# Patient Record
Sex: MALE | Race: ASIAN | NOT HISPANIC OR LATINO | Employment: FULL TIME | URBAN - METROPOLITAN AREA
[De-identification: names, ages, dates, MRNs, and addresses within clinical notes are randomized per-mention and may not be internally consistent; named-entity substitution may affect disease eponyms.]

---

## 2018-08-16 ENCOUNTER — OFFICE VISIT - HEALTHEAST (OUTPATIENT)
Dept: INTERNAL MEDICINE | Facility: CLINIC | Age: 28
End: 2018-08-16

## 2018-08-16 DIAGNOSIS — Z23 VACCINE FOR TYPHOID-PARATYPHOID ALONE (TAB): ICD-10-CM

## 2018-08-16 DIAGNOSIS — Z23: ICD-10-CM

## 2018-08-16 DIAGNOSIS — Z71.84 COUNSELING FOR TRAVEL: ICD-10-CM

## 2018-08-16 DIAGNOSIS — Z23 NEED FOR DIPHTHERIA-TETANUS-PERTUSSIS (TDAP) VACCINE, ADULT/ADOLESCENT: ICD-10-CM

## 2018-08-16 DIAGNOSIS — Z23 VACCINE FOR VIRAL HEPATITIS: ICD-10-CM

## 2018-08-16 DIAGNOSIS — Z71.84 COUNSELING ABOUT TRAVEL: ICD-10-CM

## 2018-08-17 ENCOUNTER — COMMUNICATION - HEALTHEAST (OUTPATIENT)
Dept: INTERNAL MEDICINE | Facility: CLINIC | Age: 28
End: 2018-08-17

## 2018-08-23 ENCOUNTER — AMBULATORY - HEALTHEAST (OUTPATIENT)
Dept: INTERNAL MEDICINE | Facility: CLINIC | Age: 28
End: 2018-08-23

## 2018-08-23 ENCOUNTER — AMBULATORY - HEALTHEAST (OUTPATIENT)
Dept: NURSING | Facility: CLINIC | Age: 28
End: 2018-08-23

## 2018-08-23 ENCOUNTER — COMMUNICATION - HEALTHEAST (OUTPATIENT)
Dept: TELEHEALTH | Facility: CLINIC | Age: 28
End: 2018-08-23

## 2018-08-23 DIAGNOSIS — Z71.84 COUNSELING FOR TRAVEL: ICD-10-CM

## 2018-08-23 DIAGNOSIS — Z00.00 HEALTH CARE MAINTENANCE: ICD-10-CM

## 2019-07-17 ENCOUNTER — OFFICE VISIT - HEALTHEAST (OUTPATIENT)
Dept: INTERNAL MEDICINE | Facility: CLINIC | Age: 29
End: 2019-07-17

## 2019-07-17 DIAGNOSIS — J20.9 ACUTE BRONCHITIS WITH SYMPTOMS > 10 DAYS: ICD-10-CM

## 2019-07-17 RX ORDER — FERROUS SULFATE 325(65) MG
1 TABLET ORAL
Status: SHIPPED | COMMUNITY
Start: 2019-07-17 | End: 2023-08-16

## 2019-07-17 RX ORDER — BENZONATATE 100 MG/1
100 CAPSULE ORAL 3 TIMES DAILY PRN
Qty: 30 CAPSULE | Refills: 0 | Status: SHIPPED | OUTPATIENT
Start: 2019-07-17 | End: 2023-08-16

## 2019-07-17 ASSESSMENT — MIFFLIN-ST. JEOR: SCORE: 1740.98

## 2019-07-26 ENCOUNTER — OFFICE VISIT - HEALTHEAST (OUTPATIENT)
Dept: FAMILY MEDICINE | Facility: CLINIC | Age: 29
End: 2019-07-26

## 2019-07-26 DIAGNOSIS — J40 BRONCHITIS: ICD-10-CM

## 2021-05-30 NOTE — PROGRESS NOTES
Internal Medicine Office Visit  [facility]  Patient Name: George Ocasio  Patient Age: 28 y.o.  YOB: 1990  MRN: [id number]    Date of Visit: 2019  Reason for Office Visit:   Chief Complaint   Patient presents with     Cough     2-3 weeks. Not coughing anything up. SOB when running or coughing really hard. Coughs hard to the point of almost vomiting. No fevers. No chest congestion.            Assessment / Plan / Medical Decision Makin. Acute bronchitis with symptoms > 10 days  - predniSONE (DELTASONE) 20 MG tablet; Take 10 mg by mouth daily for 5 days.  Dispense: 5 tablet; Refill: 0  - amoxicillin (AMOXIL) 875 MG tablet; Take 1 tablet (875 mg total) by mouth 2 (two) times a day for 10 days.  Dispense: 20 tablet; Refill: 0  - benzonatate (TESSALON PERLES) 100 MG capsule; Take 1 capsule (100 mg total) by mouth 3 (three) times a day as needed for cough.  Dispense: 30 capsule; Refill: 0    Patient advised if symptoms persist, worsen or new symptoms arise they are to seek medical care.  All patients questions addressed. Patient verbalized understanding and agreement with plan.     No orders of the defined types were placed in this encounter.  Followup: Return in about 1 week (around 2019). earlier if needed.    Health Maintenance Review  Health Maintenance   Topic Date Due     ADVANCE DIRECTIVES DISCUSSED WITH PATIENT  2006     INFLUENZA VACCINE RULE BASED (1) 2019     TD 18+ HE  2028     TDAP ADULT ONE TIME DOSE  Completed         I am having George Ocasio start on predniSONE, amoxicillin, and benzonatate. I am also having him maintain his mefloquine, typhoid, and ferrous sulfate. We will continue to administer typhoid polysaccharide.      HPI:  George Ocasio is a 28 y.o. year old who presents to the office today for chief complaint of a 3-week history of cough mild shortness of breath with activity without chest pain, chest pressure heart palpitations.   Patient reports coughing to the point of vomiting.  He denies any fever, chills, malaise or diaphoresis.  He has been utilizing over-the-counter cough and cold medications was not found relief of symptoms.        Review of Systems- pertinent positive in bold:  Constitutional: Fever, chills, night sweats, fainting, weight change, fatigue, dizziness, sleeping difficulties, loud snoring/pauses in breathing  Eyes: change in vision, blurred or double vision, redness/eye pain  Ears, nose, mouth, throat: change in hearing, ear pain, hoarseness, difficulty swallowing, sores in the mouth or throat  Respiratory: shortness of breath, cough, bloody sputum, wheezing  Cardiovascular: chest pain, palpitations   Gastrointestinal: abdominal pain, heartburn/indigestion, nausea/vomiting, change in appetite, change in bowel habits, constipation or diarrhea, rectal bleeding/dark stools, difficulty swallowing  Urinary: painful urination, frequent urination, urinary urgency/incontinence, blood in urine/dark urine, nocturia (new or increasing), muscle cramps, swelling of hands, feet, ankles, leg pain/redness  Skin: change in moles/freckles, rash, nodules  Hematologic/lymphatic: swollen lymph glands, abnormal bruising/bleeding  Endocrine: excessive thirst/urination, cold or heat intolerance  Neurologic/emotional: worrisome memory change, numbness/tingling, anxiety, mood swings      Current Scheduled Meds:  Outpatient Encounter Medications as of 7/17/2019   Medication Sig Dispense Refill     ferrous sulfate 325 (65 FE) MG tablet Take 1 tablet by mouth daily with breakfast.       amoxicillin (AMOXIL) 875 MG tablet Take 1 tablet (875 mg total) by mouth 2 (two) times a day for 10 days. 20 tablet 0     benzonatate (TESSALON PERLES) 100 MG capsule Take 1 capsule (100 mg total) by mouth 3 (three) times a day as needed for cough. 30 capsule 0     mefloquine (LARIAM) 250 mg tablet Take 1 tablet weekly, starting three weeks before departure. 8  "tablet 0     predniSONE (DELTASONE) 20 MG tablet Take 10 mg by mouth daily for 5 days. 5 tablet 0     typhoid (VIVOTIF) SR capsule Take 1 capsule by mouth every other day. 4 capsule 0     Facility-Administered Encounter Medications as of 7/17/2019   Medication Dose Route Frequency Provider Last Rate Last Dose     typhod polysaccharide injection 0.5 mL (TYPHIM VI)  0.5 mL Intramuscular Prior to Discharge Mario Fu MD         No past medical history on file.  No past surgical history on file.  Social History     Tobacco Use     Smoking status: Never Smoker     Smokeless tobacco: Never Used   Substance Use Topics     Alcohol use: Yes     Alcohol/week: 0.6 - 1.2 oz     Types: 1 - 2 Cans of beer per week     Drug use: No     Family History   Problem Relation Age of Onset     Diabetes Neg Hx      Heart disease Neg Hx      Objective / Physical Examination:  Vitals:    07/17/19 1317   BP: 114/74   Pulse: 92   Resp: 24   Temp: 98.7  F (37.1  C)   SpO2: 100%   Weight: 187 lb (84.8 kg)   Height: 5' 6\" (1.676 m)     Wt Readings from Last 3 Encounters:   07/17/19 187 lb (84.8 kg)   08/16/18 179 lb 4.8 oz (81.3 kg)     Body mass index is 30.18 kg/m .     General Appearance: Alert and oriented, cooperative, affect appropriate, speech clear, in no apparent distress  Head: Normocephalic, atraumatic  Ears: Tympanic membrane clear with landmarks well visualized bilaterally  Eyes: PERRL,  Conjunctivae clear and sclerae non-icteric  Nose: Septum midline, nares patent,  mucosa moist and without drainage  Throat: Lips and mucosa moist. Teeth in good repair, pharynx without erythema or exudate  Neck: Supple, trachea midline. No cervical adenopathy  Lungs: Coarsely clear to auscultation bilaterally. No adventitious lung sounds noted. Normal inspiratory and expiratory effort  Cardiovascular: Regular rate, normal S1, S2. No murmurs, rubs, or gallops  Extremities: Pulses 2+ and equal throughout. No edema.   Integumentary: Warm and dry. " Without suspicious looking lesions  Neuro: Alert and oriented, follows commands appropriately.         Breonna Maynard, CNP

## 2021-05-31 NOTE — PROGRESS NOTES
Assessment/Plan:        1. Bronchitis  Patient reporting to overall improvement   exam findings were discussed and reassurance given    Follow-up as needed        Subjective:    Patient ID:   George Ocasio is a 28 y.o. male comes in follow-up to recent evaluation of upper respiratory symptoms on 2019 with a diagnosis of acute bronchitis, treated with antibiotic and a 5-day course of oral prednisone.  Is overall doing well with no additional concerns      Review of Systems  Allergy: reviewed  A complete 5 point review of systems was obtained and is negative other than what is stated in the HPI.     The following patient's history were reviewed and updated as appropriate:   He  has no past medical history on file..      Outpatient Encounter Medications as of 2019   Medication Sig Dispense Refill     [] amoxicillin (AMOXIL) 875 MG tablet Take 1 tablet (875 mg total) by mouth 2 (two) times a day for 10 days. 20 tablet 0     benzonatate (TESSALON PERLES) 100 MG capsule Take 1 capsule (100 mg total) by mouth 3 (three) times a day as needed for cough. 30 capsule 0     ferrous sulfate 325 (65 FE) MG tablet Take 1 tablet by mouth daily with breakfast.       Facility-Administered Encounter Medications as of 2019   Medication Dose Route Frequency Provider Last Rate Last Dose     typhod polysaccharide injection 0.5 mL (TYPHIM VI)  0.5 mL Intramuscular Prior to Discharge Mario Fu MD             Objective:   /66 (Patient Site: Right Arm, Patient Position: Sitting, Cuff Size: Adult Regular)   Pulse 86   Wt 186 lb (84.4 kg)   SpO2 99%   BMI 30.02 kg/m        Physical Exam  General Appearance:    Alert, cooperative, no distress, appears stated age   Neck:   Supple, symmetrical, trachea midline, no adenopathy;        thyroid:  No enlargement/tenderness/nodules; no carotid    bruit or JVD   Back:     No palpable tenderness   Lungs:     Clear to auscultation bilaterally, respirations unlabored   Chest  wall:    No tenderness or deformity   Heart:    Regular rate and rhythm, S1 and S2 normal, no murmur, rub    or gallop   Skin:   Skin color, texture, turgor normal, no rashes or lesions

## 2021-06-01 VITALS — WEIGHT: 179.3 LBS

## 2021-06-03 VITALS — HEIGHT: 66 IN | BODY MASS INDEX: 30.05 KG/M2 | WEIGHT: 187 LBS

## 2021-06-03 VITALS — BODY MASS INDEX: 30.02 KG/M2 | WEIGHT: 186 LBS

## 2021-06-19 NOTE — PROGRESS NOTES
1. Counseling about travel     2. Counseling for travel     3. Need for diphtheria-tetanus-pertussis (Tdap) vaccine, adult/adolescent  Tdap vaccine greater than or equal to 6yo IM   4. Vaccine for viral hepatitis     5. Need for malaria vaccination     6. Vaccine for typhoid-paratyphoid alone (TAB)        Plan: Immunizations given as above, we gave him tetanus today as well as the hepatitis A vaccine.  I gave him pills for the malaria prophylaxis as well as typhoid we discussed looking at the [facility] website, I advised him to go there and look at some other things that he could do to make himself be safe while he was there.  Follow-up with him as needed then before he goes or if he has trouble with his medications.    Subjective: 27-year-old male new patient to our clinic who is here today for travel counseling.  He is heading to the [place] next month and would like to know what he needs to have done before he goes.  He is in generally good health and has no major medical problems or concerns today.    Patient is new patient to the clinic. Please see past medical history, surgical history, social history and family history, all of which were completed in their entirety today.     15 minutes spent together with the patient today, more than 50% spent in counseling, discussing the above topics.

## 2021-08-22 ENCOUNTER — HEALTH MAINTENANCE LETTER (OUTPATIENT)
Age: 31
End: 2021-08-22

## 2021-10-17 ENCOUNTER — HEALTH MAINTENANCE LETTER (OUTPATIENT)
Age: 31
End: 2021-10-17

## 2022-10-02 ENCOUNTER — HEALTH MAINTENANCE LETTER (OUTPATIENT)
Age: 32
End: 2022-10-02

## 2023-05-08 ENCOUNTER — NURSE TRIAGE (OUTPATIENT)
Dept: NURSING | Facility: CLINIC | Age: 33
End: 2023-05-08
Payer: COMMERCIAL

## 2023-05-08 NOTE — TELEPHONE ENCOUNTER
S-(situation): left ankle injury    B-(background):   Injured it yesterday.    A-(assessment):   Left ankle bruising and swelling  Unable to bear weight, uses brother's old crutches for now    No bleeding, no open wound      R-(recommendations):   Advised UC/ED. Gave options:  [facility] opens at 10 AM  [facility] or [facility] urgent care. Call insurance to see if covered.    Advised cold compress x 20 minutes QID, rest and avoid weight bearing until seen. Elevate left leg.    Pt verbalized understanding.    Tracy Mcadams RN/[place] Nurse Advisor            Reason for Disposition    Can't stand (bear weight) or walk (e.g., 4 steps)    Additional Information    Negative: Major bleeding (actively dripping or spurting) that can't be stopped    Negative: Amputation or bone sticking through the skin    Negative: Looks like a dislocated joint (crooked or deformed)    Negative: Serious injury with multiple fractures (broken bones)    Negative: Sounds like a life-threatening emergency to the triager    Negative: Wound looks infected    Negative: Caused by an animal bite    Negative: Puncture wound of foot    Negative: Toe injury is main symptom    Negative: Cast problems or questions    Negative: Bullet, stabbed by knife or other serious penetrating wound    Protocols used: ANKLE AND FOOT INJURY-A-OH

## 2023-08-14 ENCOUNTER — TELEPHONE (OUTPATIENT)
Dept: FAMILY MEDICINE | Facility: CLINIC | Age: 33
End: 2023-08-14
Payer: COMMERCIAL

## 2023-08-14 NOTE — TELEPHONE ENCOUNTER
Reason for Call:  Appointment Request    Patient requesting this type of appt:  Preventive     Requested provider:  Anyone at [facility] clinic    Reason patient unable to be scheduled: Not within requested timeframe    When does patient want to be seen/preferred time:  asap    Comments: Pt is scheduled for 10/05/2023. Has not been seen in [facility] system before but would like to come in sooner if possible. Pt states he has fatigue lately that he wants to address at this appointment & have labs drawn.     Could we send this information to you in [employer] or would you prefer to receive a phone call?:   No preference - patient would like to be contacted by both telelphone & [employer] please  Okay to leave a detailed message?: Yes at Home number on file [phone] (home)    Call taken on 8/14/2023 at 9:34 AM by Jazmín Conn

## 2023-08-14 NOTE — TELEPHONE ENCOUNTER
Call placed to patient regarding message to schedule sooner appointment. No openings sooner. Did offer to place on wait list he accepted wait list created.

## 2023-08-16 ENCOUNTER — OFFICE VISIT (OUTPATIENT)
Dept: FAMILY MEDICINE | Facility: CLINIC | Age: 33
End: 2023-08-16
Payer: COMMERCIAL

## 2023-08-16 ENCOUNTER — HOSPITAL ENCOUNTER (INPATIENT)
Facility: HOSPITAL | Age: 33
LOS: 1 days | Discharge: HOME | DRG: 395 | End: 2023-08-17
Attending: EMERGENCY MEDICINE | Admitting: INTERNAL MEDICINE
Payer: COMMERCIAL

## 2023-08-16 ENCOUNTER — APPOINTMENT (OUTPATIENT)
Dept: CT IMAGING | Facility: HOSPITAL | Age: 33
DRG: 395 | End: 2023-08-16
Attending: EMERGENCY MEDICINE
Payer: COMMERCIAL

## 2023-08-16 ENCOUNTER — APPOINTMENT (OUTPATIENT)
Dept: RADIOLOGY | Facility: HOSPITAL | Age: 33
DRG: 395 | End: 2023-08-16
Attending: EMERGENCY MEDICINE
Payer: COMMERCIAL

## 2023-08-16 VITALS
WEIGHT: 181.1 LBS | SYSTOLIC BLOOD PRESSURE: 120 MMHG | DIASTOLIC BLOOD PRESSURE: 66 MMHG | BODY MASS INDEX: 29.23 KG/M2 | OXYGEN SATURATION: 100 % | HEART RATE: 112 BPM | RESPIRATION RATE: 16 BRPM | TEMPERATURE: 98.6 F

## 2023-08-16 DIAGNOSIS — R20.2 PARESTHESIA: Primary | ICD-10-CM

## 2023-08-16 DIAGNOSIS — R42 LIGHTHEADEDNESS: ICD-10-CM

## 2023-08-16 DIAGNOSIS — R06.02 SHORTNESS OF BREATH: ICD-10-CM

## 2023-08-16 DIAGNOSIS — D64.9 ANEMIA, UNSPECIFIED TYPE: ICD-10-CM

## 2023-08-16 DIAGNOSIS — R07.9 CHEST PAIN, UNSPECIFIED TYPE: ICD-10-CM

## 2023-08-16 DIAGNOSIS — K64.8 INTERNAL HEMORRHOID, BLEEDING: Primary | ICD-10-CM

## 2023-08-16 LAB
ABO/RH(D): NORMAL
ABO/RH(D): NORMAL
ALBUMIN SERPL BCG-MCNC: 4.6 G/DL (ref 3.5–5.2)
ALP SERPL-CCNC: 54 U/L (ref 40–129)
ALT SERPL W P-5'-P-CCNC: 19 U/L (ref 0–70)
ANION GAP SERPL CALCULATED.3IONS-SCNC: 9 MMOL/L (ref 7–15)
ANTIBODY SCREEN: NEGATIVE
AST SERPL W P-5'-P-CCNC: 21 U/L (ref 0–45)
BASOPHILS # BLD AUTO: 0 10E3/UL (ref 0–0.2)
BASOPHILS NFR BLD AUTO: 1 %
BILIRUB SERPL-MCNC: 0.7 MG/DL
BLD PROD TYP BPU: NORMAL
BLD PROD TYP BPU: NORMAL
BLOOD COMPONENT TYPE: NORMAL
BLOOD COMPONENT TYPE: NORMAL
BUN SERPL-MCNC: 10.4 MG/DL (ref 6–20)
CALCIUM SERPL-MCNC: 9.3 MG/DL (ref 8.6–10)
CHLORIDE SERPL-SCNC: 104 MMOL/L (ref 98–107)
CODING SYSTEM: NORMAL
CODING SYSTEM: NORMAL
CREAT SERPL-MCNC: 0.84 MG/DL (ref 0.67–1.17)
CROSSMATCH: NORMAL
CROSSMATCH: NORMAL
D DIMER PPP FEU-MCNC: 0.29 UG/ML FEU (ref 0–0.5)
DEPRECATED HCO3 PLAS-SCNC: 26 MMOL/L (ref 22–29)
EOSINOPHIL # BLD AUTO: 0.1 10E3/UL (ref 0–0.7)
EOSINOPHIL NFR BLD AUTO: 2 %
ERYTHROCYTE [DISTWIDTH] IN BLOOD BY AUTOMATED COUNT: 19.3 % (ref 10–15)
FERRITIN SERPL-MCNC: 1 NG/ML (ref 31–409)
FLUAV RNA SPEC QL NAA+PROBE: NEGATIVE
FLUBV RNA RESP QL NAA+PROBE: NEGATIVE
GFR SERPL CREATININE-BSD FRML MDRD: >90 ML/MIN/1.73M2
GLUCOSE SERPL-MCNC: 102 MG/DL (ref 70–99)
HCT VFR BLD AUTO: 20.4 % (ref 40–53)
HGB BLD-MCNC: 4.9 G/DL (ref 13.3–17.7)
HOLD SPECIMEN: NORMAL
IMM GRANULOCYTES # BLD: 0 10E3/UL
IMM GRANULOCYTES NFR BLD: 0 %
INR PPP: 1.04 (ref 0.85–1.15)
IRON BINDING CAPACITY (ROCHE): 452 UG/DL (ref 240–430)
IRON SATN MFR SERPL: 3 % (ref 15–46)
IRON SERPL-MCNC: 13 UG/DL (ref 61–157)
ISSUE DATE AND TIME: NORMAL
ISSUE DATE AND TIME: NORMAL
LIPASE SERPL-CCNC: 50 U/L (ref 13–60)
LYMPHOCYTES # BLD AUTO: 0.8 10E3/UL (ref 0.8–5.3)
LYMPHOCYTES NFR BLD AUTO: 24 %
MAGNESIUM SERPL-MCNC: 2.2 MG/DL (ref 1.7–2.3)
MCH RBC QN AUTO: 14.4 PG (ref 26.5–33)
MCHC RBC AUTO-ENTMCNC: 24 G/DL (ref 31.5–36.5)
MCV RBC AUTO: 60 FL (ref 78–100)
MONOCYTES # BLD AUTO: 0.3 10E3/UL (ref 0–1.3)
MONOCYTES NFR BLD AUTO: 9 %
NEUTROPHILS # BLD AUTO: 2.2 10E3/UL (ref 1.6–8.3)
NEUTROPHILS NFR BLD AUTO: 64 %
NRBC # BLD AUTO: 0.1 10E3/UL
NRBC BLD AUTO-RTO: 2 /100
PLATELET # BLD AUTO: 220 10E3/UL (ref 150–450)
POTASSIUM SERPL-SCNC: 3.9 MMOL/L (ref 3.4–5.3)
PROT SERPL-MCNC: 7.3 G/DL (ref 6.4–8.3)
RBC # BLD AUTO: 3.4 10E6/UL (ref 4.4–5.9)
RSV RNA SPEC NAA+PROBE: NEGATIVE
SARS-COV-2 RNA RESP QL NAA+PROBE: NEGATIVE
SODIUM SERPL-SCNC: 139 MMOL/L (ref 136–145)
SPECIMEN EXPIRATION DATE: NORMAL
SPECIMEN EXPIRATION DATE: NORMAL
TROPONIN T SERPL HS-MCNC: <6 NG/L
UNIT ABO/RH: NORMAL
UNIT ABO/RH: NORMAL
UNIT NUMBER: NORMAL
UNIT NUMBER: NORMAL
UNIT STATUS: NORMAL
UNIT STATUS: NORMAL
UNIT TYPE ISBT: 5100
UNIT TYPE ISBT: 5100
WBC # BLD AUTO: 3.4 10E3/UL (ref 4–11)

## 2023-08-16 PROCEDURE — 80053 COMPREHEN METABOLIC PANEL: CPT | Performed by: EMERGENCY MEDICINE

## 2023-08-16 PROCEDURE — 84484 ASSAY OF TROPONIN QUANT: CPT | Performed by: EMERGENCY MEDICINE

## 2023-08-16 PROCEDURE — 96375 TX/PRO/DX INJ NEW DRUG ADDON: CPT

## 2023-08-16 PROCEDURE — 85610 PROTHROMBIN TIME: CPT | Performed by: EMERGENCY MEDICINE

## 2023-08-16 PROCEDURE — 36430 TRANSFUSION BLD/BLD COMPNT: CPT

## 2023-08-16 PROCEDURE — 250N000011 HC RX IP 250 OP 636: Mod: JZ | Performed by: EMERGENCY MEDICINE

## 2023-08-16 PROCEDURE — 96365 THER/PROPH/DIAG IV INF INIT: CPT | Mod: XU

## 2023-08-16 PROCEDURE — 71046 X-RAY EXAM CHEST 2 VIEWS: CPT

## 2023-08-16 PROCEDURE — 86923 COMPATIBILITY TEST ELECTRIC: CPT | Performed by: EMERGENCY MEDICINE

## 2023-08-16 PROCEDURE — 83735 ASSAY OF MAGNESIUM: CPT | Performed by: EMERGENCY MEDICINE

## 2023-08-16 PROCEDURE — 99207 PR INPT ADMISSION FROM CLINIC: CPT | Performed by: PHYSICIAN ASSISTANT

## 2023-08-16 PROCEDURE — 96361 HYDRATE IV INFUSION ADD-ON: CPT

## 2023-08-16 PROCEDURE — 85025 COMPLETE CBC W/AUTO DIFF WBC: CPT | Performed by: EMERGENCY MEDICINE

## 2023-08-16 PROCEDURE — 93005 ELECTROCARDIOGRAM TRACING: CPT | Performed by: PHYSICIAN ASSISTANT

## 2023-08-16 PROCEDURE — 93010 ELECTROCARDIOGRAM REPORT: CPT | Performed by: INTERNAL MEDICINE

## 2023-08-16 PROCEDURE — 99285 EMERGENCY DEPT VISIT HI MDM: CPT | Mod: 25

## 2023-08-16 PROCEDURE — 86901 BLOOD TYPING SEROLOGIC RH(D): CPT | Performed by: EMERGENCY MEDICINE

## 2023-08-16 PROCEDURE — 99222 1ST HOSP IP/OBS MODERATE 55: CPT | Performed by: INTERNAL MEDICINE

## 2023-08-16 PROCEDURE — P9016 RBC LEUKOCYTES REDUCED: HCPCS | Performed by: EMERGENCY MEDICINE

## 2023-08-16 PROCEDURE — 70498 CT ANGIOGRAPHY NECK: CPT

## 2023-08-16 PROCEDURE — 85379 FIBRIN DEGRADATION QUANT: CPT | Performed by: EMERGENCY MEDICINE

## 2023-08-16 PROCEDURE — 70496 CT ANGIOGRAPHY HEAD: CPT

## 2023-08-16 PROCEDURE — 82728 ASSAY OF FERRITIN: CPT | Performed by: EMERGENCY MEDICINE

## 2023-08-16 PROCEDURE — 93005 ELECTROCARDIOGRAM TRACING: CPT | Performed by: EMERGENCY MEDICINE

## 2023-08-16 PROCEDURE — 83550 IRON BINDING TEST: CPT | Performed by: EMERGENCY MEDICINE

## 2023-08-16 PROCEDURE — 86850 RBC ANTIBODY SCREEN: CPT | Performed by: EMERGENCY MEDICINE

## 2023-08-16 PROCEDURE — 120N000001 HC R&B MED SURG/OB

## 2023-08-16 PROCEDURE — 258N000003 HC RX IP 258 OP 636: Performed by: EMERGENCY MEDICINE

## 2023-08-16 PROCEDURE — 83690 ASSAY OF LIPASE: CPT | Performed by: EMERGENCY MEDICINE

## 2023-08-16 PROCEDURE — 36415 COLL VENOUS BLD VENIPUNCTURE: CPT | Performed by: EMERGENCY MEDICINE

## 2023-08-16 PROCEDURE — 87637 SARSCOV2&INF A&B&RSV AMP PRB: CPT | Performed by: EMERGENCY MEDICINE

## 2023-08-16 RX ORDER — ACETAMINOPHEN 325 MG/1
650 TABLET ORAL EVERY 4 HOURS PRN
Status: DISCONTINUED | OUTPATIENT
Start: 2023-08-16 | End: 2023-08-17 | Stop reason: HOSPADM

## 2023-08-16 RX ORDER — DIPHENHYDRAMINE HYDROCHLORIDE 50 MG/ML
25 INJECTION INTRAMUSCULAR; INTRAVENOUS ONCE
Status: COMPLETED | OUTPATIENT
Start: 2023-08-16 | End: 2023-08-16

## 2023-08-16 RX ORDER — LIDOCAINE 40 MG/G
CREAM TOPICAL
Status: DISCONTINUED | OUTPATIENT
Start: 2023-08-16 | End: 2023-08-17 | Stop reason: HOSPADM

## 2023-08-16 RX ORDER — IOPAMIDOL 755 MG/ML
75 INJECTION, SOLUTION INTRAVASCULAR ONCE
Status: COMPLETED | OUTPATIENT
Start: 2023-08-16 | End: 2023-08-16

## 2023-08-16 RX ORDER — ACETAMINOPHEN 500 MG
500 TABLET ORAL PRN
COMMUNITY
End: 2023-12-05

## 2023-08-16 RX ORDER — METOCLOPRAMIDE HYDROCHLORIDE 5 MG/ML
5 INJECTION INTRAMUSCULAR; INTRAVENOUS ONCE
Status: COMPLETED | OUTPATIENT
Start: 2023-08-16 | End: 2023-08-16

## 2023-08-16 RX ADMIN — METOCLOPRAMIDE 5 MG: 5 INJECTION, SOLUTION INTRAMUSCULAR; INTRAVENOUS at 13:06

## 2023-08-16 RX ADMIN — DIPHENHYDRAMINE HYDROCHLORIDE 25 MG: 50 INJECTION, SOLUTION INTRAMUSCULAR; INTRAVENOUS at 13:06

## 2023-08-16 RX ADMIN — IOPAMIDOL 75 ML: 755 INJECTION, SOLUTION INTRAVENOUS at 15:16

## 2023-08-16 RX ADMIN — SODIUM CHLORIDE 1000 ML: 9 INJECTION, SOLUTION INTRAVENOUS at 13:06

## 2023-08-16 ASSESSMENT — ACTIVITIES OF DAILY LIVING (ADL)
ADLS_ACUITY_SCORE: 38

## 2023-08-16 NOTE — MEDICATION SCRIBE - ADMISSION MEDICATION HISTORY
Medication Scribe Admission Medication History    Admission medication history is complete. The information provided in this note is only as accurate as the sources available at the time of the update.    Medication reconciliation/reorder completed by provider prior to medication history? No    Information Source(s): Patient via in-person    Pertinent Information:     Changes made to PTA medication list:  Added: Tylenol, Robitussin Nighttime  Deleted: Iron, Tessalon, Typhoid V  Changed: None    Medication Affordability:  Not including over the counter (OTC) medications, was there a time in the past 3 months when you did not take your medications as prescribed because of cost?: No    Allergies reviewed with patient and updates made in EHR: yes    Medication History Completed By: Yong Morgan 8/16/2023 2:32 PM    Prior to Admission medications    Medication Sig Last Dose Taking? Auth Provider Long Term End Date   acetaminophen (TYLENOL) 500 MG tablet Take 500 mg by mouth as needed for mild pain 8/15/2023 at 1800 Yes Reported, Patient     [id number] (ROBITUSSIN NIGHT [facility]/COLD/FLU PO) Take by mouth as needed 8/15/2023 at pm Yes Reported, Patient

## 2023-08-16 NOTE — ED TRIAGE NOTES
Amb to triage.  Pt states for about 1 month noted skin seems pale-denies black, tarry, or bloody stools or vomiting.  For about 1 wk intermittent numbness in L arm and consistent for about 24 hours.  Notes pain in L upper chest when looks upward with head.  Pt also notes has had headache in back base of head for 1 wk and consistent for 24 hours.  Denies trauma.      Triage Assessment       Row Name 08/16/23 1134       Triage Assessment (Adult)    Airway WDL WDL       Respiratory WDL    Respiratory WDL WDL       Skin Circulation/Temperature WDL    Skin Circulation/Temperature WDL all    Skin Circulation pallor  reports pale skin for about 1 month    Skin Temperature warm       Cardiac WDL    Cardiac WDL X;rhythm    Pulse Rate & Regularity tachycardic;radial pulse regular       Peripheral/Neurovascular WDL    Peripheral Neurovascular WDL WDL       Cognitive/Neuro/Behavioral WDL    Cognitive/Neuro/Behavioral WDL WDL

## 2023-08-16 NOTE — ED PROVIDER NOTES
EMERGENCY DEPARTMENT ENCOUNTER      NAME: George Ocasio  AGE: 32 year old male  YOB: 1990  MRN: [id number]  EVALUATION DATE & TIME: 8/16/2023 11:39 AM    PCP: No Ref-Primary, Physician    ED PROVIDER: Anne Ibrahim MD      Chief Complaint   Patient presents with    Numbness    Shortness of Breath         FINAL IMPRESSION:  1. Anemia, unspecified type          ED COURSE & MEDICAL DECISION MAKING:    Pertinent Labs & Imaging studies reviewed. (See chart for details)    11:44 AM I introduced myself to the patient, obtained patient history, performed a physical exam, and discussed plan for ED workup including potential diagnostic laboratory/imaging studies and interventions.  4:49 PM I spoke with the Dr. Gaines, Hospitalist.      32 year old male presents to the Emergency Department for evaluation of left arm numbness/paresthesia, light headedness, and shortness of breath.  Allan he is mildly tachycardic.  Does not actually have any focal deficit despite his report of subjective paresthesias in the left upper extremity that extend into all of his fingers.  He has been having chiropractic manipulation done of his neck and has a posterior occiput headache similar to prior migraines.  He does appear pale.  Abdominal exam is benign without any tenderness or pain.  Differential diagnosis includes but is not limited to carotid or vertebral dissection, intracranial hemorrhage, less likely CVA with his presentation, atypical acute coronary syndrome, anemia, COVID-19 or other viral illness, mass, atypical migraine, PE, less likely aortic dissection without chest pain, etc.  EKG was obtained which shows a right bundle branch block but no sign of acute ischemia.  Laboratory studies obtained.  Discussed treatment for migraine with IV Reglan and IV Benadryl as well as a liter of IV fluids and he was in agreement with this.  Held on Toradol until imaging.  D-dimer obtained which is negative making PE unlikely.  Also makes  aortic dissection less likely.  CTA of the head and neck as well as noncontrast CT obtained of the head.  Chest x-ray obtained.    Hemoglobin returned at 4.9.  This is likely the cause of many of his symptoms.  White blood cell count also slightly decreased at 3.4 with normal platelet count.  CMP is unremarkable.  Lipase within normal limits.  Magnesium within normal limits.  Troponin less than 6 and reassuring EKG making acute coronary syndrome less likely in a healthy 32-year-old.  Type and screen added and 2 units of packed red blood cells were given.  Patient was consented for this and in agreement.  COVID-19 influenza and RSV PCR is negative.  Added on iron studies as well.  INR within normal limits.  CT of the head and neck and noncontrast CT without acute pathology.  Chest x-ray unremarkable.  He does report that he has had some intermittent bright red blood per rectum for a long time.  States he last had this about a week ago.  No current bleeding.  He felt it was potentially related to hemorrhoids.  Unfortunately is in the hallway so I cannot perform a rectal exam at this time.  Do feel he warrants admission for further evaluation and GI evaluation and monitoring of hemoglobin after transfusion.  His tachycardia improved with transfusion.  He is in agreement with this plan.  Spoke with the hospitalist who excepted the patient for admission.  He was admitted in stable condition.         At the conclusion of the encounter I discussed the results of all of the tests and the disposition. The questions were answered. The patient or family acknowledged understanding and was agreeable with the care plan.     Critical Care  Performed by: Anne Ibrahim MD  Authorized by: Anne Ibrahim MD     Total critical care time: 30 minutes  Critical care time was exclusive of separately billable procedures and treating other patients.  Critical care was necessary to treat or prevent imminent or life-threatening  deterioration of the following conditions: severe anemia requiring blood transfusion  Critical care was time spent personally by me on the following activities: development of treatment plan with patient or surrogate, discussions with consultants, examination of patient, evaluation of patient's response to treatment, obtaining history from patient or surrogate, ordering and performing treatments and interventions, ordering and review of laboratory studies, ordering and review of radiographic studies and re-evaluation of patient's condition, this excludes any separately billable procedures.        Medical Decision Making    History:  Supplemental history from: Documented in chart, if applicable  External Record(s) reviewed: Documented in chart, if applicable. and Outpatient Record: Clinic visit earlier today    Work Up:  Chart documentation includes differential considered and any EKGs or imaging independently interpreted by provider.  In additional to work up documented, I considered the following work up: Documented in chart, if applicable.    External consultation:  Discussion of management with another provider: Documented in chart, if applicable    Complicating factors:  Care impacted by chronic illness: N/A  Care affected by social determinants of health: N/A    Disposition considerations: Admit.      MEDICATIONS GIVEN IN THE EMERGENCY:  Medications   0.9% sodium chloride BOLUS (0 mLs Intravenous Stopped 8/16/23 1408)   metoclopramide (REGLAN) injection 5 mg (5 mg Intravenous $Given 8/16/23 1306)   diphenhydrAMINE (BENADRYL) injection 25 mg (25 mg Intravenous $Given 8/16/23 1306)   iopamidol (ISOVUE-370) solution 75 mL (75 mLs Intravenous $Given 8/16/23 1516)       NEW PRESCRIPTIONS STARTED AT TODAY'S ER VISIT  New Prescriptions    No medications on file          =================================================================    HPI    Patient information was obtained from: the patient    Use of :  N/A      George Ocasio is a 32 year old male with no known medical history who presents to this ED for evaluation of shortness of breath and numbness.    Per chart review, the patient was seen earlier today at clinic for left arm numbness and tingling. The patient was instructed to present to the ED for further evaluation.    Patient presents with intermittent left arm numbness and tingling that radiates to all of his fingers in his left hand. This has been constant for the past day but has been intermittent for the past week. He additionally reports shortness of breath with exertion and lightheadedness. The patient endorses a headache centralized in the back right of his head that feels similar to his past migraines. The patient regularly sees a chiropractor where he has neck adjustments done. His last visit was last Thursday. He reports that he has had a dry cough for the past month and had a previous negative covid test at home. He had COVID last year. The patient reports no history of DVT/PE or cardiac disease. Denies any family history of premature CAD or DVT/PE. He denies any recent travel. No leg pain or swelling. He has been taking robitussin (last at 11:30 AM) and Tylenol (last 14 hrs ago) for his symptoms with some relief. He otherwise takes no regular medication. The patient denies fever, chest pain, chills, abdominal pain, vision changes, weakness, nausea, vomiting, diarrhea, and any other symptoms at this time. No injury to the left arm.     He later states he has intermittent bright red rectal bleeding that has occurred for over a year. He reports he last had some bleeding last week. He felt this may have been related to hemorrhoids. He has not been evaluated for this. No melena. No hematuria or other bleeding.     REVIEW OF SYSTEMS   Review of Systems   Pertinent positives and negatives are documented in the HPI. All other systems reviewed and are negative.      PAST MEDICAL HISTORY:  History reviewed. No  "pertinent past medical history.    PAST SURGICAL HISTORY:  No past surgical history on file.        CURRENT MEDICATIONS:    acetaminophen (TYLENOL) 500 MG tablet  [id number] (ROBITUSSIN NIGHT CGH/COLD/FLU PO)        ALLERGIES:  No Known Allergies    FAMILY HISTORY:  Family History   Problem Relation Age of Onset    Diabetes No family hx of     Heart Disease No family hx of        SOCIAL HISTORY:   Social History     Socioeconomic History    Marital status: Single   Tobacco Use    Smoking status: Never    Smokeless tobacco: Never   Substance and Sexual Activity    Alcohol use: Yes     Alcohol/week: 1.0 - 2.0 standard drink of alcohol    Drug use: No       VITALS:  /75   Pulse 90   Temp 99  F (37.2  C) (Oral)   Resp 23   Ht 1.676 m (5' 6\")   Wt 83 kg (182 lb 14.4 oz)   SpO2 99%   BMI 29.52 kg/m      PHYSICAL EXAM    Physical Exam  Constitutional: Well developed, Well nourished, NAD, GCS 15  HENT: Normocephalic, Atraumatic, Bilateral external ears normal, Oropharynx normal, mucous membranes moist, Nose normal. Neck- Normal range of motion, No tenderness, Supple, No stridor.    Eyes: PERRL, EOMI, Conjunctiva normal, No discharge.   Respiratory: Normal breath sounds, No respiratory distress, No wheezing or crackles, Speaks in full sentences easily.   Cardiovascular: Tachycardia, Regular rhythm, No murmurs, No rubs, No gallops. 2+ radial pulses bilaterally.   GI: Bowel sounds normal, Soft, No tenderness, No masses, No rebound or guarding. No CVA tenderness bilaterally   Musculoskeletal: 2+ DP pulses. No notable lower extremity edema.  Good range of motion in all major joints. No tenderness to palpation or major deformities noted. No tenderness of the CTLS spine.   Integument: Warm, Dry, Pale, No erythema, No rash. No petechiae.   Neurologic: Alert & oriented x 3, 5/5 strength in all 4 extremities bilaterally. Sensation intact to light touch in all 4 extremities and the face bilaterally. No focal " deficits noted. Normal gait. CN 2-12 intact. No visual field deficits. Finger to nose and heel to shin intact bilaterally.     Psychiatric: Affect normal, Judgment normal, Mood normal. Cooperative.      LAB:  All pertinent labs reviewed and interpreted.  Results for orders placed or performed during the hospital encounter of 08/16/23   Chest XR,  PA & LAT    Impression    IMPRESSION: Negative chest.   CTA Head Neck with Contrast    Impression    IMPRESSION:   HEAD CT:  1.  Normal head CT.    HEAD CTA:   1.  Normal CTA [place].    NECK CTA:  1.  No stenosis or dissection.  2.  Variant anatomy of the right vertebral artery.   Comprehensive metabolic panel   Result Value Ref Range    Sodium 139 136 - 145 mmol/L    Potassium 3.9 3.4 - 5.3 mmol/L    Chloride 104 98 - 107 mmol/L    Carbon Dioxide (CO2) 26 22 - 29 mmol/L    Anion Gap 9 7 - 15 mmol/L    Urea Nitrogen 10.4 6.0 - 20.0 mg/dL    Creatinine 0.84 0.67 - 1.17 mg/dL    Calcium 9.3 8.6 - 10.0 mg/dL    Glucose 102 (H) 70 - 99 mg/dL    Alkaline Phosphatase 54 40 - 129 U/L    AST 21 0 - 45 U/L    ALT 19 0 - 70 U/L    Protein Total 7.3 6.4 - 8.3 g/dL    Albumin 4.6 3.5 - 5.2 g/dL    Bilirubin Total 0.7 <=1.2 mg/dL    GFR Estimate >90 >60 mL/min/1.73m2   Result Value Ref Range    Lipase 50 13 - 60 U/L   Result Value Ref Range    Troponin T, High Sensitivity <6 <=22 ng/L   Result Value Ref Range    Magnesium 2.2 1.7 - 2.3 mg/dL   CBC with platelets and differential   Result Value Ref Range    WBC Count 3.4 (L) 4.0 - 11.0 10e3/uL    RBC Count 3.40 (L) 4.40 - 5.90 10e6/uL    Hemoglobin 4.9 (LL) 13.3 - 17.7 g/dL    Hematocrit 20.4 (L) 40.0 - 53.0 %    MCV 60 (L) 78 - 100 fL    MCH 14.4 (L) 26.5 - 33.0 pg    MCHC 24.0 (L) 31.5 - 36.5 g/dL    RDW 19.3 (H) 10.0 - 15.0 %    Platelet Count 220 150 - 450 10e3/uL    % Neutrophils 64 %    % Lymphocytes 24 %    % Monocytes 9 %    % Eosinophils 2 %    % Basophils 1 %    % Immature Granulocytes 0 %    NRBCs per 100 WBC 2 (H)  <1 /100    Absolute Neutrophils 2.2 1.6 - 8.3 10e3/uL    Absolute Lymphocytes 0.8 0.8 - 5.3 10e3/uL    Absolute Monocytes 0.3 0.0 - 1.3 10e3/uL    Absolute Eosinophils 0.1 0.0 - 0.7 10e3/uL    Absolute Basophils 0.0 0.0 - 0.2 10e3/uL    Absolute Immature Granulocytes 0.0 <=0.4 10e3/uL    Absolute NRBCs 0.1 10e3/uL   Extra Blue Top Tube   Result Value Ref Range    Hold Specimen JIC    Extra Green Top (Lithium Heparin) Tube   Result Value Ref Range    Hold Specimen JIC    Extra Purple Top Tube   Result Value Ref Range    Hold Specimen JIC    Symptomatic Influenza A/B, RSV, & SARS-CoV2 PCR (COVID-19) Nasopharyngeal    Specimen: Nasopharyngeal; Swab   Result Value Ref Range    Influenza A PCR Negative Negative    Influenza B PCR Negative Negative    RSV PCR Negative Negative    SARS CoV2 PCR Negative Negative   D dimer quantitative   Result Value Ref Range    D-Dimer Quantitative 0.29 0.00 - 0.50 ug/mL FEU   Result Value Ref Range    INR 1.04 0.85 - 1.15   Iron and iron binding capacity   Result Value Ref Range    Iron 13 (L) 61 - 157 ug/dL    Iron Binding Capacity 452 (H) 240 - 430 ug/dL    Iron Sat Index 3 (L) 15 - 46 %   Adult Type and Screen   Result Value Ref Range    ABO/RH(D) O POS     Antibody Screen Negative Negative    SPECIMEN EXPIRATION DATE 20230819235900    ABO and Rh   Result Value Ref Range    ABO/RH(D) O POS     SPECIMEN EXPIRATION DATE 20230819235900    Prepare red blood cells (unit)   Result Value Ref Range    Blood Component Type Red Blood Cells     Product Code S6001I32     Unit Status Transfused     Unit Number [id number]     CROSSMATCH Compatible     CODING SYSTEM [id number]     ISSUE DATE AND TIME [id number]     UNIT ABO/RH O+     UNIT TYPE ISBT 5100    Prepare red blood cells (unit)   Result Value Ref Range    Blood Component Type Red Blood Cells     Product Code T2735Z31     Unit Status Transfused     Unit Number [id number]     CROSSMATCH Compatible     CODING SYSTEM BCKJ743     ISSUE  DATE AND TIME [id number]     UNIT ABO/RH O+     UNIT TYPE ISBT 5100        RADIOLOGY:  Reviewed all pertinent imaging. Please see official radiology report.  Chest XR,  PA & LAT   Final Result   IMPRESSION: Negative chest.      CTA Head Neck with Contrast   Final Result   IMPRESSION:    HEAD CT:   1.  Normal head CT.      HEAD CTA:    1.  Normal CTA [place].      NECK CTA:   1.  No stenosis or dissection.   2.  Variant anatomy of the right vertebral artery.          EKG:    Performed at: 11:55:21    Impression:   Sinus rhythm  Incomplete right bundle branch block  Borderline ECG    Rate: 95 bpm  Rhythm: Sinus  Axis: 61  AR Interval: 144 ms  QRS Interval: 110 ms  QTc Interval: 417 ms  Comparison: When compared with ECG of 16-Aug-2023 11:01, questionable change in QRS axis    I have independently reviewed and interpreted the EKG(s) documented above.    PROCEDURES:   None.      [facility] Documentation:   CMS Diagnoses:               I, Philippe Dowell, am serving as a scribe to document services personally performed by Anen Ibrahim MD based on my observation and the provider's statements to me. I, Anne Ibrahim MD, attest that Philippe Dowell is acting in a scribe capacity, has observed my performance of the services and has documented them in accordance with my direction.    Anne Ibrahim MD  [facility] EMERGENCY DEPARTMENT  [address] [place] [id number]  [phone]       Anne Ibrahim MD  08/18/23 0135

## 2023-08-17 ENCOUNTER — ANESTHESIA EVENT (OUTPATIENT)
Dept: SURGERY | Facility: HOSPITAL | Age: 33
DRG: 395 | End: 2023-08-17
Payer: COMMERCIAL

## 2023-08-17 ENCOUNTER — ANESTHESIA (OUTPATIENT)
Dept: SURGERY | Facility: HOSPITAL | Age: 33
DRG: 395 | End: 2023-08-17
Payer: COMMERCIAL

## 2023-08-17 VITALS
HEIGHT: 67 IN | DIASTOLIC BLOOD PRESSURE: 53 MMHG | HEART RATE: 70 BPM | OXYGEN SATURATION: 96 % | WEIGHT: 185.41 LBS | SYSTOLIC BLOOD PRESSURE: 95 MMHG | TEMPERATURE: 99.9 F | RESPIRATION RATE: 16 BRPM | BODY MASS INDEX: 29.1 KG/M2

## 2023-08-17 PROBLEM — K64.8 INTERNAL HEMORRHOID, BLEEDING: Status: ACTIVE | Noted: 2023-08-17

## 2023-08-17 LAB
ATRIAL RATE - MUSE: 103 BPM
COLONOSCOPY: NORMAL
DIASTOLIC BLOOD PRESSURE - MUSE: NORMAL MMHG
ERYTHROCYTE [DISTWIDTH] IN BLOOD BY AUTOMATED COUNT: 24.9 % (ref 10–15)
HCT VFR BLD AUTO: 27.3 % (ref 40–53)
HGB BLD-MCNC: 7.2 G/DL (ref 13.3–17.7)
INTERPRETATION ECG - MUSE: NORMAL
MCH RBC QN AUTO: 17.3 PG (ref 26.5–33)
MCHC RBC AUTO-ENTMCNC: 26.4 G/DL (ref 31.5–36.5)
MCV RBC AUTO: 66 FL (ref 78–100)
P AXIS - MUSE: 47 DEGREES
PLATELET # BLD AUTO: 201 10E3/UL (ref 150–450)
PR INTERVAL - MUSE: 138 MS
QRS DURATION - MUSE: 108 MS
QT - MUSE: 340 MS
QTC - MUSE: 445 MS
R AXIS - MUSE: 3 DEGREES
RBC # BLD AUTO: 4.15 10E6/UL (ref 4.4–5.9)
SYSTOLIC BLOOD PRESSURE - MUSE: NORMAL MMHG
T AXIS - MUSE: 19 DEGREES
UPPER GI ENDOSCOPY: NORMAL
VENTRICULAR RATE- MUSE: 103 BPM
WBC # BLD AUTO: 4.4 10E3/UL (ref 4–11)

## 2023-08-17 PROCEDURE — 258N000003 HC RX IP 258 OP 636: Performed by: NURSE ANESTHETIST, CERTIFIED REGISTERED

## 2023-08-17 PROCEDURE — 88305 TISSUE EXAM BY PATHOLOGIST: CPT | Mod: 26 | Performed by: PATHOLOGY

## 2023-08-17 PROCEDURE — 250N000009 HC RX 250: Performed by: NURSE ANESTHETIST, CERTIFIED REGISTERED

## 2023-08-17 PROCEDURE — 360N000075 HC SURGERY LEVEL 2, PER MIN: Performed by: INTERNAL MEDICINE

## 2023-08-17 PROCEDURE — 36415 COLL VENOUS BLD VENIPUNCTURE: CPT | Performed by: INTERNAL MEDICINE

## 2023-08-17 PROCEDURE — 85027 COMPLETE CBC AUTOMATED: CPT | Performed by: INTERNAL MEDICINE

## 2023-08-17 PROCEDURE — 999N000141 HC STATISTIC PRE-PROCEDURE NURSING ASSESSMENT: Performed by: INTERNAL MEDICINE

## 2023-08-17 PROCEDURE — 370N000017 HC ANESTHESIA TECHNICAL FEE, PER MIN: Performed by: INTERNAL MEDICINE

## 2023-08-17 PROCEDURE — 88341 IMHCHEM/IMCYTCHM EA ADD ANTB: CPT | Mod: 26 | Performed by: PATHOLOGY

## 2023-08-17 PROCEDURE — 88305 TISSUE EXAM BY PATHOLOGIST: CPT | Mod: TC | Performed by: INTERNAL MEDICINE

## 2023-08-17 PROCEDURE — 88365 INSITU HYBRIDIZATION (FISH): CPT | Mod: 26 | Performed by: PATHOLOGY

## 2023-08-17 PROCEDURE — 88342 IMHCHEM/IMCYTCHM 1ST ANTB: CPT | Mod: 26 | Performed by: PATHOLOGY

## 2023-08-17 PROCEDURE — 88364 INSITU HYBRIDIZATION (FISH): CPT | Mod: TC | Performed by: INTERNAL MEDICINE

## 2023-08-17 PROCEDURE — 0DB68ZX EXCISION OF STOMACH, VIA NATURAL OR ARTIFICIAL OPENING ENDOSCOPIC, DIAGNOSTIC: ICD-10-PCS | Performed by: INTERNAL MEDICINE

## 2023-08-17 PROCEDURE — 250N000011 HC RX IP 250 OP 636: Performed by: NURSE ANESTHETIST, CERTIFIED REGISTERED

## 2023-08-17 PROCEDURE — 272N000001 HC OR GENERAL SUPPLY STERILE: Performed by: INTERNAL MEDICINE

## 2023-08-17 PROCEDURE — 0DJD8ZZ INSPECTION OF LOWER INTESTINAL TRACT, VIA NATURAL OR ARTIFICIAL OPENING ENDOSCOPIC: ICD-10-PCS | Performed by: INTERNAL MEDICINE

## 2023-08-17 PROCEDURE — 99238 HOSP IP/OBS DSCHRG MGMT 30/<: CPT | Performed by: INTERNAL MEDICINE

## 2023-08-17 PROCEDURE — G0378 HOSPITAL OBSERVATION PER HR: HCPCS

## 2023-08-17 PROCEDURE — 258N000003 HC RX IP 258 OP 636: Performed by: ANESTHESIOLOGY

## 2023-08-17 PROCEDURE — 88364 INSITU HYBRIDIZATION (FISH): CPT | Mod: 26 | Performed by: PATHOLOGY

## 2023-08-17 RX ORDER — OXYCODONE HYDROCHLORIDE 5 MG/1
5 TABLET ORAL
Status: CANCELLED | OUTPATIENT
Start: 2023-08-17

## 2023-08-17 RX ORDER — LIDOCAINE 40 MG/G
CREAM TOPICAL
Status: DISCONTINUED | OUTPATIENT
Start: 2023-08-17 | End: 2023-08-17 | Stop reason: HOSPADM

## 2023-08-17 RX ORDER — FENTANYL CITRATE 0.05 MG/ML
INJECTION, SOLUTION INTRAMUSCULAR; INTRAVENOUS PRN
Status: DISCONTINUED | OUTPATIENT
Start: 2023-08-17 | End: 2023-08-17

## 2023-08-17 RX ORDER — PROPOFOL 10 MG/ML
INJECTION, EMULSION INTRAVENOUS CONTINUOUS PRN
Status: DISCONTINUED | OUTPATIENT
Start: 2023-08-17 | End: 2023-08-17

## 2023-08-17 RX ORDER — ONDANSETRON 2 MG/ML
4 INJECTION INTRAMUSCULAR; INTRAVENOUS
Status: DISCONTINUED | OUTPATIENT
Start: 2023-08-17 | End: 2023-08-17 | Stop reason: HOSPADM

## 2023-08-17 RX ORDER — ONDANSETRON 2 MG/ML
INJECTION INTRAMUSCULAR; INTRAVENOUS PRN
Status: DISCONTINUED | OUTPATIENT
Start: 2023-08-17 | End: 2023-08-17

## 2023-08-17 RX ORDER — ONDANSETRON 2 MG/ML
4 INJECTION INTRAMUSCULAR; INTRAVENOUS EVERY 30 MIN PRN
Status: CANCELLED | OUTPATIENT
Start: 2023-08-17

## 2023-08-17 RX ORDER — ONDANSETRON 4 MG/1
4 TABLET, ORALLY DISINTEGRATING ORAL EVERY 30 MIN PRN
Status: CANCELLED | OUTPATIENT
Start: 2023-08-17

## 2023-08-17 RX ORDER — OXYCODONE HYDROCHLORIDE 5 MG/1
10 TABLET ORAL
Status: CANCELLED | OUTPATIENT
Start: 2023-08-17

## 2023-08-17 RX ORDER — PANTOPRAZOLE SODIUM 40 MG/1
40 TABLET, DELAYED RELEASE ORAL DAILY
Qty: 30 TABLET | Refills: 0 | Status: SHIPPED | OUTPATIENT
Start: 2023-08-17 | End: 2023-08-22

## 2023-08-17 RX ORDER — LIDOCAINE HYDROCHLORIDE 10 MG/ML
INJECTION, SOLUTION INFILTRATION; PERINEURAL PRN
Status: DISCONTINUED | OUTPATIENT
Start: 2023-08-17 | End: 2023-08-17

## 2023-08-17 RX ORDER — SODIUM CHLORIDE, SODIUM LACTATE, POTASSIUM CHLORIDE, CALCIUM CHLORIDE 600; 310; 30; 20 MG/100ML; MG/100ML; MG/100ML; MG/100ML
INJECTION, SOLUTION INTRAVENOUS CONTINUOUS
Status: DISCONTINUED | OUTPATIENT
Start: 2023-08-17 | End: 2023-08-17 | Stop reason: HOSPADM

## 2023-08-17 RX ADMIN — SODIUM CHLORIDE, POTASSIUM CHLORIDE, SODIUM LACTATE AND CALCIUM CHLORIDE: 600; 310; 30; 20 INJECTION, SOLUTION INTRAVENOUS at 14:41

## 2023-08-17 RX ADMIN — PHENYLEPHRINE HYDROCHLORIDE 100 MCG: 10 INJECTION INTRAVENOUS at 15:35

## 2023-08-17 RX ADMIN — MIDAZOLAM 2 MG: 1 INJECTION INTRAMUSCULAR; INTRAVENOUS at 15:14

## 2023-08-17 RX ADMIN — FENTANYL CITRATE 50 MCG: 0.05 INJECTION, SOLUTION INTRAMUSCULAR; INTRAVENOUS at 15:18

## 2023-08-17 RX ADMIN — PROPOFOL 200 MCG/KG/MIN: 10 INJECTION, EMULSION INTRAVENOUS at 15:18

## 2023-08-17 RX ADMIN — LIDOCAINE HYDROCHLORIDE 5 ML: 10 INJECTION, SOLUTION INFILTRATION; PERINEURAL at 15:18

## 2023-08-17 RX ADMIN — ONDANSETRON 4 MG: 2 INJECTION INTRAMUSCULAR; INTRAVENOUS at 15:35

## 2023-08-17 ASSESSMENT — ACTIVITIES OF DAILY LIVING (ADL)
ADLS_ACUITY_SCORE: 38

## 2023-08-17 NOTE — ANESTHESIA PREPROCEDURE EVALUATION
Anesthesia Pre-Procedure Evaluation    Patient: George Ocasio   MRN: [id number] : 1990        Procedure : Procedure(s):  COLONOSCOPY  ESOPHAGOGASTRODUODENOSCOPY          History reviewed. No pertinent past medical history.   No past surgical history on file.   No Known Allergies   Social History     Tobacco Use    Smoking status: Never    Smokeless tobacco: Never   Substance Use Topics    Alcohol use: Yes     Alcohol/week: 1.0 - 2.0 standard drink of alcohol      Wt Readings from Last 1 Encounters:   23 84.1 kg (185 lb 6.5 oz)        Anesthesia Evaluation   Pt has not had prior anesthetic         ROS/MED HX  ENT/Pulmonary:     (+)                             recent URI,          Neurologic:  - neg neurologic ROS     Cardiovascular:  - neg cardiovascular ROS     METS/Exercise Tolerance: >4 METS    Hematologic:     (+)      anemia (Hgb 4.9 on arrival --> transfused 2 units and 7.2 this morning), history of blood transfusion,         Musculoskeletal:  - neg musculoskeletal ROS     GI/Hepatic: Comment: Concern for acute GIB - neg GI/hepatic ROS     Renal/Genitourinary:  - neg Renal ROS     Endo:  - neg endo ROS     Psychiatric/Substance Use:  - neg psychiatric ROS     Infectious Disease:  - neg infectious disease ROS     Malignancy:  - neg malignancy ROS     Other:  - neg other ROS          Physical Exam    Airway  airway exam normal      Mallampati: II   TM distance: > 3 FB   Neck ROM: full   Mouth opening: > 3 cm    Respiratory Devices and Support         Dental  no notable dental history         Cardiovascular   cardiovascular exam normal          Pulmonary   pulmonary exam normal                OUTSIDE LABS:  CBC:   Lab Results   Component Value Date    WBC 4.4 2023    WBC 3.4 (L) 2023    HGB 7.2 (L) 2023    HGB 4.9 (LL) 2023    HCT 27.3 (L) 2023    HCT 20.4 (L) 2023     2023     2023     BMP:   Lab Results   Component Value Date      08/16/2023    POTASSIUM 3.9 08/16/2023    CHLORIDE 104 08/16/2023    CO2 26 08/16/2023    BUN 10.4 08/16/2023    CR 0.84 08/16/2023     (H) 08/16/2023     COAGS:   Lab Results   Component Value Date    INR 1.04 08/16/2023     POC: No results found for: BGM, HCG, HCGS  HEPATIC:   Lab Results   Component Value Date    ALBUMIN 4.6 08/16/2023    PROTTOTAL 7.3 08/16/2023    ALT 19 08/16/2023    AST 21 08/16/2023    ALKPHOS 54 08/16/2023    BILITOTAL 0.7 08/16/2023     OTHER:   Lab Results   Component Value Date    TUSHAR 9.3 08/16/2023    MAG 2.2 08/16/2023    LIPASE 50 08/16/2023       Anesthesia Plan    ASA Status:  3    NPO Status:  NPO Appropriate    Anesthesia Type: MAC.     - Reason for MAC: straight local not clinically adequate   Induction: Propofol.   Maintenance: TIVA.        Consents    Anesthesia Plan(s) and associated risks, benefits, and realistic alternatives discussed. Questions answered and patient/representative(s) expressed understanding.     - Discussed:     - Discussed with:  Patient, Parent (Mother and/or Father)      - Extended Intubation/Ventilatory Support Discussed: No.      - Patient is DNR/DNI Status: No     Use of blood products discussed: No .     Postoperative Care    Pain management: IV analgesics, Multi-modal analgesia, Oral pain medications.   PONV prophylaxis: Ondansetron (or other 5HT-3)     Comments:                Toni Bhakta MD

## 2023-08-17 NOTE — H&P
"[facility]    History and Physical - Hospitalist Service       Date of Admission:  8/16/2023    Assessment & Plan      George Ocasio is a 32 year old male admitted on 8/16/2023 for symptomatic anemia with hemoglobbin 4.9.     Microcytic iron deficiency anemia:   Hemoglobin 4.9 on admission with generalized weakness, dizziness and dyspnea on exertion. Unknown hemoglobin baseline.  Reports having rectal bleeding for 5 days due to hemorrhoid recently.   Lab tests show low iron, low ferritin.  - Received 2 units of blood transfusion in ER.   - Continue to monitor hemoglobin and transfuse as indicated.    - GI consult    Leukopenia: WBC 3.4, unknown baseline. Monitor       Diet: Regular Diet Adult    DVT Prophylaxis: Low Risk/Ambulatory with no VTE prophylaxis indicated  Key Catheter: Not present  Lines: None     Cardiac Monitoring: None  Code Status: Full Code      Clinically Significant Risk Factors Present on Admission                       # Overweight: Estimated body mass index is 29.52 kg/m  as calculated from the following:    Height as of this encounter: 1.676 m (5' 6\").    Weight as of this encounter: 83 kg (182 lb 14.4 oz).              Disposition Plan      Expected Discharge Date: 08/18/2023                  Anahi Gaines MD  Hospitalist Service  [facility]  Securely message with [employer] (more info)  Text page via [facility] Paging/Directory     ______________________________________________________________________    Chief Complaint   Generalized weakness and dizziness    History is obtained from the patient    History of Present Illness   George Ocasio is a 32 year old male without significant medical history presents to ER for generalized weakness and dizziness.  Patient reports that for the past few days, he has been feeling generalized weakness and dizziness.  When he walks, he feels shortness of breath.  He also feels left upper extremity numbness, neck pain and " headache. He reports no fever, cough, nausea, vomiting, abdominal pain and dysuria. In ER, head CT, CTA head and neck were unremarkable. Chest XR is normal. He was found to have hemoglobin 4.9.  When questioning, patient reports that he has chronic intermittent rectal bleeding due to hemorrhoid.  His last episode was about 1 week ago.  He had rectal bleeding about 2 times a day for 5 days.  For each bowel movement, the blood is enough to make the toilet bowl water red. His stool has now turned brown for the past few days. He reports no family history of colon cancer. He has never done EGD and colonoscopy.       Past Medical History    History reviewed. No pertinent past medical history.    Past Surgical History   No past surgical history on file.    Prior to Admission Medications   Prior to Admission Medications   Prescriptions Last Dose Informant Patient Reported? Taking?   [id number]-[id number]-PW-APAP (ROBITUSSIN NIGHT CGH/COLD/FLU PO) 8/15/2023 at pm  Yes Yes   Sig: Take by mouth as needed   acetaminophen (TYLENOL) 500 MG tablet 8/15/2023 at 1800  Yes Yes   Sig: Take 500 mg by mouth as needed for mild pain      Facility-Administered Medications: None        Review of Systems    The 10 point Review of Systems is negative other than noted in the HPI or here.      Physical Exam   Vital Signs: Temp: 98.6  F (37  C) Temp src: Oral BP: 121/75 Pulse: 84   Resp: 29 SpO2: 98 % O2 Device: None (Room air)    Weight: 182 lbs 14.4 oz    General appearance: not in acute distress. Pale appearance  HEENT: PERRL, EOMI  Lungs: Clear breath sounds in bilateral lung fields  Cardiovascular: Regular rate and rhythm, normal S1-S2  Abdomen: Soft, non tender, no distension  Musculoskeletal: No joint swelling  Skin: No rash and no edema  Neurology: AAO ×3.  Cranial nerves II - XII normal.  Normal muscle strength in all four extremities.     Medical Decision Making       60 MINUTES SPENT BY ME on the date of service doing chart review, history,  exam, documentation & further activities per the note.      Data     I have personally reviewed the following data over the past 24 hrs:    3.4 (L)  \   4.9 (LL)   / 220     139 104 10.4 /  102 (H)   3.9 26 0.84 \     ALT: 19 AST: 21 AP: 54 TBILI: 0.7   ALB: 4.6 TOT PROTEIN: 7.3 LIPASE: 50     Trop: <6 BNP: N/A     INR:  1.04 PTT:  N/A   D-dimer:  0.29 Fibrinogen:  N/A     Ferritin:  1 (L) % Retic:  N/A LDH:  N/A       Imaging results reviewed over the past 24 hrs:   Recent Results (from the past 24 hour(s))   CTA Head Neck with Contrast    Narrative    EXAM: CTA HEAD NECK W CONTRAST  LOCATION: [facility]  DATE: 8/16/2023    INDICATION: neck manipulation, left arm numbness, headache, eval for dissection, etc  COMPARISON: None.  CONTRAST: isovue 370 75ml  TECHNIQUE: Head and neck CT angiogram with IV contrast. Noncontrast head CT followed by axial helical CT images of the head and neck vessels obtained during the arterial phase of intravenous contrast administration. Axial 2D reconstructed images and   multiplanar 3D MIP reconstructed images of the head and neck vessels were performed by the technologist. Dose reduction techniques were used. All stenosis measurements made according to NASCET criteria unless otherwise specified.    FINDINGS:   NONCONTRAST HEAD CT:   INTRACRANIAL CONTENTS: No intracranial hemorrhage, extraaxial collection, or mass effect.  No CT evidence of acute infarct. Normal parenchymal attenuation. Normal ventricles and sulci.     VISUALIZED ORBITS/SINUSES/MASTOIDS: No intraorbital abnormality. No paranasal sinus mucosal disease. No middle ear or mastoid effusion.    BONES/SOFT TISSUES: No acute abnormality.    HEAD CTA:  ANTERIOR CIRCULATION: No stenosis/occlusion, aneurysm, or high flow vascular malformation. Standard [place] anatomy.    POSTERIOR CIRCULATION: No stenosis/occlusion, aneurysm, or high flow vascular malformation. Balanced vertebral arteries supply  a normal basilar artery.     DURAL VENOUS SINUSES: Expected enhancement of the major dural venous sinuses.    NECK CTA:  RIGHT CAROTID: No measurable stenosis or dissection.    LEFT CAROTID: No measurable stenosis or dissection.    VERTEBRAL ARTERIES: No focal stenosis or dissection. Vertebral arteries are codominant. Variant anatomy at the right vertebral artery with dominant accessory vertebral artery that travels ventrally anterior to the scalene muscles until level of C4-C5   where it enters into the C4 transverse foramen.    AORTIC ARCH: Classic aortic arch anatomy with no significant stenosis at the origin of the great vessels.    NONVASCULAR STRUCTURES: Unremarkable.      Impression    IMPRESSION:   HEAD CT:  1.  Normal head CT.    HEAD CTA:   1.  Normal CTA [place].    NECK CTA:  1.  No stenosis or dissection.  2.  Variant anatomy of the right vertebral artery.   Chest XR,  PA & LAT    Narrative    EXAM: XR CHEST 2 VIEWS  LOCATION: [facility]  DATE: 8/16/2023    INDICATION: shortness of breath, chest pain  COMPARISON: None.      Impression    IMPRESSION: Negative chest.

## 2023-08-17 NOTE — DISCHARGE SUMMARY
"[facility]  Hospitalist Discharge Summary      Date of Admission:  8/16/2023  Date of Discharge:  8/17/2023  Discharging Provider: Dary Wilson MD  Discharge Service: Hospitalist Service    Discharge Diagnoses   Principal Problem:    Anemia, unspecified type  Active Problems:    Internal hemorrhoid, bleeding      Clinically Significant Risk Factors     # Overweight: Estimated body mass index is 29.04 kg/m  as calculated from the following:    Height as of this encounter: 1.702 m (5' 7\").    Weight as of this encounter: 84.1 kg (185 lb 6.5 oz).       Follow-ups Needed After Discharge   Follow-up Appointments     Follow-up and recommended labs and tests       Follow up with primary care provider, Physician No Ref-Primary, within 7   days for hospital follow- up.  The following labs/tests are recommended:   CBC  .            Unresulted Labs Ordered in the Past 30 Days of this Admission       Date and Time Order Name Status Description    8/17/2023  3:26 PM Surgical Pathology Exam In process         These results will be followed up by [facility]    Discharge Disposition   Discharged to home  Condition at discharge: Stable    Hospital Course   George Ocasio is a 32 year old male admitted on 8/16/2023 for symptomatic anemia with hemoglobbin 4.9. generalized weakness, dizziness and dyspnea on exertion. Unknown hemoglobin baseline.     Microcytic iron deficiency anemia with rectal bleeding from internal hemorrhoids   - low iron, low ferritin. Consider oral iron but after follow up with PCP to ensure bleeding stopped   - Received 2 units of blood transfusion Hgb stable and vitals    - EGD today with biopsy no source of bleeding and colonoscopy no acute findings other then internal hemorrhoids        Consultations This Hospital Stay   GASTROENTEROLOGY IP CONSULT    Code Status   Full Code    Time Spent on this Encounter          Dary Wilson MD  [facility]  [address] " [facility] [address]  Phone: [phone]  Fax: [phone]  ______________________________________________________________________    Physical Exam   Vital Signs: Temp: 99.9  F (37.7  C) Temp src: Oral BP: 95/53 Pulse: 70   Resp: 16 SpO2: 96 % O2 Device: None (Room air)    Weight: 185 lbs 6.51 oz  Physical Exam  Constitutional:       Appearance: Normal appearance.   Cardiovascular:      Rate and Rhythm: Normal rate and regular rhythm.      Pulses: Normal pulses.      Heart sounds: Normal heart sounds.   Pulmonary:      Effort: Pulmonary effort is normal.      Breath sounds: Normal breath sounds.   Abdominal:      General: Bowel sounds are normal.   Musculoskeletal:         General: Normal range of motion.   Skin:     General: Skin is warm and dry.      Capillary Refill: Capillary refill takes less than 2 seconds.   Neurological:      General: No focal deficit present.      Mental Status: He is alert and oriented to person, place, and time. Mental status is at baseline.              Primary Care Physician   Physician No Ref-Primary    Discharge Orders      Reason for your hospital stay    Principal Problem:    Anemia, unspecified type  Active Problems:    Internal hemorrhoid, bleeding     Follow-up and recommended labs and tests     Follow up with primary care provider, Physician No Ref-Primary, within 7 days for hospital follow- up.  The following labs/tests are recommended: CBC  .     Activity    Your activity upon discharge: activity as tolerated     Discharge Instructions    No NSAIDs     Diet    Follow this diet upon discharge: regular diet       Significant Results and Procedures   Most Recent 3 CBC's:  Recent Labs   Lab Test 08/17/23  0700 08/16/23  1210   WBC 4.4 3.4*   HGB 7.2* 4.9*   MCV 66* 60*    220     Most Recent 3 BMP's:  Recent Labs   Lab Test 08/16/23  1210      POTASSIUM 3.9   CHLORIDE 104   CO2 26   BUN 10.4   CR 0.84   ANIONGAP 9   TUSHAR 9.3   *     Most Recent 2  LFT's:  Recent Labs   Lab Test 08/16/23  1210   AST 21   ALT 19   ALKPHOS 54   BILITOTAL 0.7     Most Recent 3 INR's:  Recent Labs   Lab Test 08/16/23  1210   INR 1.04   ,   Results for orders placed or performed during the hospital encounter of 08/16/23   Chest XR,  PA & LAT    Narrative    EXAM: XR CHEST 2 VIEWS  LOCATION: [facility]  DATE: 8/16/2023    INDICATION: shortness of breath, chest pain  COMPARISON: None.      Impression    IMPRESSION: Negative chest.   CTA Head Neck with Contrast    Narrative    EXAM: CTA HEAD NECK W CONTRAST  LOCATION: [facility]  DATE: 8/16/2023    INDICATION: neck manipulation, left arm numbness, headache, eval for dissection, etc  COMPARISON: None.  CONTRAST: isovue 370 75ml  TECHNIQUE: Head and neck CT angiogram with IV contrast. Noncontrast head CT followed by axial helical CT images of the head and neck vessels obtained during the arterial phase of intravenous contrast administration. Axial 2D reconstructed images and   multiplanar 3D MIP reconstructed images of the head and neck vessels were performed by the technologist. Dose reduction techniques were used. All stenosis measurements made according to NASCET criteria unless otherwise specified.    FINDINGS:   NONCONTRAST HEAD CT:   INTRACRANIAL CONTENTS: No intracranial hemorrhage, extraaxial collection, or mass effect.  No CT evidence of acute infarct. Normal parenchymal attenuation. Normal ventricles and sulci.     VISUALIZED ORBITS/SINUSES/MASTOIDS: No intraorbital abnormality. No paranasal sinus mucosal disease. No middle ear or mastoid effusion.    BONES/SOFT TISSUES: No acute abnormality.    HEAD CTA:  ANTERIOR CIRCULATION: No stenosis/occlusion, aneurysm, or high flow vascular malformation. Standard [place] anatomy.    POSTERIOR CIRCULATION: No stenosis/occlusion, aneurysm, or high flow vascular malformation. Balanced vertebral arteries supply a normal basilar  artery.     DURAL VENOUS SINUSES: Expected enhancement of the major dural venous sinuses.    NECK CTA:  RIGHT CAROTID: No measurable stenosis or dissection.    LEFT CAROTID: No measurable stenosis or dissection.    VERTEBRAL ARTERIES: No focal stenosis or dissection. Vertebral arteries are codominant. Variant anatomy at the right vertebral artery with dominant accessory vertebral artery that travels ventrally anterior to the scalene muscles until level of C4-C5   where it enters into the C4 transverse foramen.    AORTIC ARCH: Classic aortic arch anatomy with no significant stenosis at the origin of the great vessels.    NONVASCULAR STRUCTURES: Unremarkable.      Impression    IMPRESSION:   HEAD CT:  1.  Normal head CT.    HEAD CTA:   1.  Normal CTA [place].    NECK CTA:  1.  No stenosis or dissection.  2.  Variant anatomy of the right vertebral artery.       Discharge Medications   Current Discharge Medication List        CONTINUE these medications which have NOT CHANGED    Details   acetaminophen (TYLENOL) 500 MG tablet Take 500 mg by mouth as needed for mild pain      [id number]-APAP (ROBITUSSIN NIGHT CGH/COLD/FLU PO) Take by mouth as needed           Allergies   No Known Allergies

## 2023-08-17 NOTE — CONSULTS
-PA Addendum-  EGD/Colonoscopy showing internal/external hemorrhoids, likely the source of bleed. Ordered follow up for further evaluation and treatment in our clinic.   [facility] Consultation    George Ocasio  [address] [address]  32 year old male     Admission Date/Time: 8/16/2023  Primary Care Provider: No Ref-Primary, Physician  Referring / Attending Physician:  Dr. Gaines      We were asked to see the patient in consultation by Dr. Gaines for evaluation of anemia and rectal bleeding.    CC: Dyspnea, dizziness, rectal bleeding     HPI:  George Ocasio is a 32 year old otherwise healthy male who presented to clinic yesterday for dizziness, shortness of breath, and paresthesias. At that time he was sent to the emergency department where he reported 5 days of rectal bleeding and was found to have a hemoglobin of 4.9. Today the patient reports that he has had intermittent rectal bleeding for the past 2-3 years. He states this typically happens when he is straining to have a bowel movement. He typically has 1-2 bowel movements per day. He notes that in the past, the bleeding was sporadic, and could be weekly, or monthly, or every few months. He denies any associated rectal or abdominal pain with the bleeding. One month ago, the rectal bleeding began occurring daily with each bowel movement. He denies any bleeding in between bowel movements. Two weeks ago he developed dizziness and some shortness of breath. These symptoms continued to persist and worsen until yesterday, when symptoms became associated with left arm numbness and tingling, prompting him to go into his clinic yesterday. At that time, given his symptoms, an EKG was done that showed sinus tachycardia with a right bundle branch block. He was then sent to the ER for further evaluation. In the emergency department he had workup including labs and imaging. Labs were significant for a hemoglobin of 4.9, and he was transfused with 2 units of blood.  "Today he feels significantly improved after receiving the transfusions, with resolution of his left arm numbness, and improvement of his dizziness and shortness of breath. He denies any abdominal pain, nausea, vomiting, heartburn/reflux, rectal pain. He denies ever having a colonoscopy. He denies any history of GI disorders or malignancies. He denies being anticoagulated.      ROS: A comprehensive ten point review of systems was negative aside from those in mentioned in the HPI.      PAST MEDICAL HISTORY:  Patient Active Problem List    Diagnosis Date Noted    Anemia, unspecified type 08/16/2023     Priority: Medium     SOCIAL HISTORY:  Social History     Tobacco Use    Smoking status: Never    Smokeless tobacco: Never   Substance Use Topics    Alcohol use: Yes     Alcohol/week: 1.0 - 2.0 standard drink of alcohol    Drug use: No   The patient denies tobacco use  The patient reports 1-2 alcoholic beverages/week    FAMILY HISTORY:  Family History   Problem Relation Age of Onset    Diabetes No family hx of     Heart Disease No family hx of    Denies family history of GI disorders or malignancies    ALLERGIES:   No Known Allergies    MEDICATIONS:   Current Facility-Administered Medications   Medication    acetaminophen (TYLENOL) tablet 650 mg    lidocaine (LMX4) cream    lidocaine 1 % 0.1-1 mL    melatonin tablet 1 mg    polyethylene glycol (GoLYTELY) suspension 4,000 mL    sodium chloride (PF) 0.9% PF flush 3 mL    sodium chloride (PF) 0.9% PF flush 3 mL     Surgical History  The patient denies any abdominal surgeries     PHYSICAL EXAM:   /71 (BP Location: Right arm)   Pulse 82   Temp 98.9  F (37.2  C) (Oral)   Resp 18   Ht 1.702 m (5' 7\")   Wt 84.1 kg (185 lb 6.5 oz)   SpO2 98%   BMI 29.04 kg/m     GEN: NAD, laying in bed  HEENT: No icterus  HRT: Appears well perfused, no LE edema  LUNGS: No respiratory distress  ABD: soft and non-tender  SKIN: No rash  MSKL: Moving extremities spontaneously and " appropriately   NEURO: Alert and oriented     ADDITIONAL DATA:   I reviewed the patient's new clinical lab test results.   Recent Labs   Lab Test 08/17/23  0700 08/16/23  1210   WBC 4.4 3.4*   HGB 7.2* 4.9*   MCV 66* 60*    220   INR  --  1.04     Recent Labs   Lab Test 08/16/23  1210   POTASSIUM 3.9   CHLORIDE 104   CO2 26   BUN 10.4   ANIONGAP 9     Recent Labs   Lab Test 08/16/23  1210   ALBUMIN 4.6   BILITOTAL 0.7   ALT 19   AST 21   LIPASE 50       Imaging results:    CTA head neck w/ contrast   IMPRESSION:   HEAD CT:  1.  Normal head CT.    HEAD CTA:   1.  Normal CTA [place].     NECK CTA:  1.  No stenosis or dissection.  2.  Variant anatomy of the right vertebral artery.    IMPRESSION: Negative chest.     ASSESSMENT:    This is an otherwise healthy 32 year old male who was admitted yesterday for dizziness, shortness of breath and rectal bleeding, and found to have a hemoglobin of 4.9. The patient reports feeling significantly improved this morning after receiving 2 units of blood.  Repeat hemoglobin this morning is improved to 7.2. He has had rectal bleeding on and off for 2-3 years, but daily over the last month. Given the bleeding is bright red, and historically comes with straining to have a bowel movement, highest suspicion for outlet bleeding. Other considerations include but are not limited to diverticulitis, AVM, IBD, or malignancy. Would also like to rule out rapid upper GI bleed from peptic ulcer disease or malignancy. Scheduled for EGD/Colon this afternoon to evaluate for these concerns. Starting Golytely prep this AM with plan for procedures tentatively at 3 PM.     PLAN:  - EGD and Colon this afternoon  - Continue to monitor hemoglobin  - Transfuse as needed    Clara Russell PA-C  [facility]  8/17/2023 9:01 AM  [phone] (office)    This case was discussed with Dr. Davis who agrees to the above assessment and plan.    45 minutes of total time was spent today  "providing patient care, including patient evaluation, reviewing documentation/test result, and .   ________________________________________________________________________          GI Staff Addendum  DOS 8/17/2023     Pt seen and examined. Agree with Clara Russell's documentation.    32 year old yo M present with weakness, SOB. Hgb <5. Evidence of iron deficiency. C/o rectal bleeding which has subsequently stopped. Rare NSAID use. No weight loss. No melena. No Fhx of gastric or colon cancer.    /82 (BP Location: Right arm)   Pulse 99   Temp 99  F (37.2  C) (Oral)   Resp 18   Ht 1.702 m (5' 7\")   Wt 84.1 kg (185 lb 6.5 oz)   SpO2 99%   BMI 29.04 kg/m    General: A&O, NAD, non-toxic appearing  Eyes: No icterus or conjunctivitis  Gastrointestinal: Soft, NTTP, no r/g  Skin: No jaundice/petechiae/rashes    Hgb 7.6. MCV <70    A/P:  Iron def anemia - Hx of rectal bleeding. Possible malignancy, AVM. Chronic hemorrhoids. Other UGIB source contributing remains possible.  Weakness  SOB    -NPO  -Transfuse for Hgb >7.  -Prep for colonoscopy/EGD    15 minutes of total time was spent providing patient care including patient evaluation, reviewing documentation/test results, and .    Boyd Davis MD on 8/17/2023 at 3:11 PM    "

## 2023-08-17 NOTE — UTILIZATION REVIEW
"Admission Status; Secondary Review Determination     Under the authority of the Utilization Management Committee, the utilization review process indicated a secondary review on George Ocasio.  The review outcome is based on review of the medical records, discussions with staff, and applying clinical experience noted on the date of the review.     (x) Observation Status Appropriate - This patient does not meet hospital inpatient criteria and is placed in observation status. If this patient's primary payer is Medicare and was admitted as an inpatient, Condition Code 44 should be used and patient status changed to \"observation\".     RATIONALE FOR DETERMINATION   George Ocasio is a 32 yr old male who presented with symptomatic anemia with HGB 4.9.  Currently improved after transfusion to 7.2.  GI pursuing EGD and colonoscopy to look for any potential malignancy or other etiology for rapid drop as very symptomatic in last 5 days.  Discussed with Dr. Wilson.  If EGD and colon ok then likely discharge later today vs tomorrow however if significant findings, further evaluation/treatments may be needed.  At this time, consider observation but if findings prompt further treatments then go back to inpatient.    The severity of illness, intensity of service provided, expected LOS and risk for adverse outcome make the care appropriate for further observation; however, doesn't meet criteria for hospital inpatient admission. Dr Wilson notified of this determination and agrees with downgrade of status.      The information on this document is developed by the utilization review team in order for the business office to ensure compliance.  This only denotes the appropriateness of proper admission status and does not reflect the quality of care rendered.         The definitions of Inpatient Status and Observation Status used in making the determination above are those provided in the CMS Coverage Manual, Chapter 1 and Chapter 6, section " 70.4.      Sincerely,  Breonna Reagan MD  Utilization Review  Physician Advisor  [facility]

## 2023-08-17 NOTE — ANESTHESIA CARE TRANSFER NOTE
Patient: George Ocasio    Procedure: Procedure(s):  COLONOSCOPY  ESOPHAGOGASTRODUODENOSCOPY       Diagnosis: Anemia, unspecified type [D64.9]  Diagnosis Additional Information: No value filed.    Anesthesia Type:   MAC     Note:    Oropharynx: oropharynx clear of all foreign objects and spontaneously breathing  Level of Consciousness: awake and drowsy  Oxygen Supplementation: room air    Independent Airway: airway patency satisfactory and stable  Dentition: dentition unchanged  Vital Signs Stable: post-procedure vital signs reviewed and stable  Report to RN Given: handoff report given  Patient transferred to: Medical/Surgical Unit    Handoff Report: Identifed the Patient, Identified the Reponsible Provider, Reviewed the pertinent medical history, Discussed the surgical course, Reviewed Intra-OP anesthesia mangement and issues during anesthesia, Set expectations for post-procedure period and Allowed opportunity for questions and acknowledgement of understanding      Vitals:  Vitals Value Taken Time   /75 08/17/23 1559   Temp 36.2  C (97.2  F) 08/17/23 1559   Pulse 74 08/17/23 1559   Resp 16 08/17/23 1559   SpO2 97 % 08/17/23 1559       Electronically Signed By: LILIYA Gage CRNA  August 17, 2023  4:00 PM

## 2023-08-17 NOTE — PLAN OF CARE
"Goal Outcome Evaluation:      Plan of Care Reviewed With: patient    Overall Patient Progress: improvingOverall Patient Progress: improving    Outcome Evaluation: Pt alert and oriented. Denies pain, dizziness, SOB. No bloody BMs overnight. Independent in room. VSS. Slept between cares.    BP 95/53 (BP Location: Right arm)   Pulse 75   Temp 98  F (36.7  C) (Oral)   Resp 18   Ht 1.702 m (5' 7\")   Wt 84.1 kg (185 lb 6.5 oz)   SpO2 98%   BMI 29.04 kg/m      Davey Mcgowan RN    "

## 2023-08-17 NOTE — ANESTHESIA POSTPROCEDURE EVALUATION
Patient: George Ocasio    Procedure: Procedure(s):  COLONOSCOPY  ESOPHAGOGASTRODUODENOSCOPY       Anesthesia Type:  MAC    Note:  Disposition: Inpatient   Postop Pain Control: Uneventful            Sign Out: Well controlled pain   PONV: No   Neuro/Psych: Uneventful            Sign Out: Acceptable/Baseline neuro status   Airway/Respiratory: Uneventful            Sign Out: Acceptable/Baseline resp. status   CV/Hemodynamics: Uneventful            Sign Out: Acceptable CV status; No obvious hypovolemia; No obvious fluid overload   Other NRE: NONE   DID A NON-ROUTINE EVENT OCCUR?            Last vitals:  Vitals:    08/17/23 0731 08/17/23 1431 08/17/23 1559   BP: 109/71 132/82 122/75   Pulse: 82 99 74   Resp: 18 18 16   Temp: 37.2  C (98.9  F) 37.2  C (99  F) 36.2  C (97.2  F)   SpO2: 98% 99% 97%       Electronically Signed By: Cameron Dominguez MD  August 17, 2023  4:02 PM

## 2023-08-17 NOTE — DISCHARGE INSTRUCTIONS
Instructions per Dr. Davis:      Drink 64 oz water daily   Start Metamucil 1 tbsp mixed with 8 oz water once daily   Avoid straining with bowel movements.   No aspirin, ibuprofen, naproxen, or other non-steroidal anti-inflammatory drugs.   Use Protonix (pantoprazole) 40 mg by mouth daily.   Clinic appointment for hemorrhoid treatment.    Will coordinate follow up and communicate pathology results.

## 2023-08-17 NOTE — PROGRESS NOTES
Pre-procedure Note    Reason for procedure: Microcytic anemia, iron def    History and Physical Reviewed: Reviewed, no changes.    Pre-sedation assessment:    General: alert, appears stated age, and cooperative  Airway: normal  Heart: regular rate and rhythm  Lungs: clear to auscultation bilaterally    Sedation Plan based on assessment: MAC    Mallampati score: Class II (visualization of the soft palate, fauces, and uvula)          ASA Classification: ASA 3 - Patient with moderate systemic disease with functional limitations    Impression: Patient deemed adequate candidate for MAC sedation    Risks, benefits and alternatives were discussed with the patient and informed consent was obtained.    Plan: colonoscopy and esophagogastroduodenoscopy      Boyd Davis MD 8/17/2023 3:12 PM                                               Boyd Davis MD  Thank you for the opportunity to participate in the care of this patient.   Please feel free to call me with any questions or concerns.  Phone number [phone].

## 2023-08-17 NOTE — PLAN OF CARE
Problem: Plan of Care - These are the overarching goals to be used throughout the patient stay.    Goal: Absence of Hospital-Acquired Illness or Injury  Intervention: Prevent Skin Injury  Recent Flowsheet Documentation  Taken 8/17/2023 0750 by Sarah Beth Neil RN  Body Position: position changed independently     Problem: Plan of Care - These are the overarching goals to be used throughout the patient stay.    Goal: Optimal Comfort and Wellbeing  Outcome: Progressing   Goal Outcome Evaluation:    Patient AAO. Denies pain. VSS on room air. Ambulating independently in room.  Completed partial bowel prep this a.m., then patient developed some nausea. GI notified, bowel prep paused, patient then became NPO, and colonoscopy was carried through with this afternoon. Hgb 7.2 today. No transfusion indicated at this time. Patient calls appropriately. No further concerns at this time.

## 2023-08-17 NOTE — ED NOTES
"[facility] ED Handoff Report    ED Chief Complaint: Shortness of breath, numbness    ED Diagnosis:  (D64.9) Anemia, unspecified type  Comment: 2 pack rbc given  Plan: Monitor hgb       PMH:  History reviewed. No pertinent past medical history.     Code Status:  Full Code     Falls Risk: No Band: Not applicable    Current Living Situation/Residence: lives with a significant other     Elimination Status: Continent: Yes     Activity Level: Independent    Patients Preferred Language:  English     Needed: No    Vital Signs:  /75   Pulse 79   Temp 98.6  F (37  C) (Oral)   Resp 18   Ht 1.676 m (5' 6\")   Wt 83 kg (182 lb 14.4 oz)   SpO2 98%   BMI 29.52 kg/m       Cardiac Rhythm: NSR    Pain Score: 0/10    Is the Patient Confused:  No    Last Food or Drink: 08/16/23    Focused Assessment:      Tests Performed: Done: Labs and Imaging    Treatments Provided:      Family Dynamics/Concerns: No    Family Updated On Visitor Policy: Yes    Plan of Care Communicated to Family: Yes    Who Was Updated about Plan of Care: Wife    Belongings Checklist Done and Signed by Patient: Yes    Medications sent with patient:     Covid: symptomatic, negative    Additional Information:     RN: Gabriel Herbert RN   8/16/2023 8:53 PM      "

## 2023-08-18 NOTE — PLAN OF CARE
PRIMARY DIAGNOSIS: GI BLEED    OUTPATIENT/OBSERVATION GOALS TO BE MET BEFORE DISCHARGE  Orthostatic performed: N/A    Stable Hgb Yes.   Recent Labs   Lab Test 08/17/23  0700 08/16/23  1210   HGB 7.2* 4.9*       Resolved or declined bleeding episodes: Yes     Appropriate testing complete: Yes    Cleared for discharge by consultants (if involved): Yes    Safe discharge environment identified: Yes    Discharge Planner Nurse   Safe discharge environment identified: Yes  Barriers to discharge: No       Entered by: Tanya Aponte RN 08/17/2023 7:51 PM     Patient returned to unit from EGD + colonoscopy around 1600, VSS upon return to unit, A+Ox4, denying pain. Per procedures notes, no source of acute bleeding found. [facility] OK with patient discharge and follow up in clinic, discharge orders placed by hospitalist. Writer went over AVS and discharge education w/ patient. Patient discharged home with father as transportation shortly before 2000.     For vital signs and complete assessments, please see documentation flowsheets. For detailed medication administrations, please see PABLO Aponte RN 8/17/2023   [id number]

## 2023-08-21 LAB
ATRIAL RATE - MUSE: 95 BPM
DIASTOLIC BLOOD PRESSURE - MUSE: NORMAL MMHG
INTERPRETATION ECG - MUSE: NORMAL
P AXIS - MUSE: 64 DEGREES
PR INTERVAL - MUSE: 144 MS
QRS DURATION - MUSE: 110 MS
QT - MUSE: 332 MS
QTC - MUSE: 417 MS
R AXIS - MUSE: 61 DEGREES
SYSTOLIC BLOOD PRESSURE - MUSE: NORMAL MMHG
T AXIS - MUSE: 54 DEGREES
VENTRICULAR RATE- MUSE: 95 BPM

## 2023-08-22 ENCOUNTER — OFFICE VISIT (OUTPATIENT)
Dept: INTERNAL MEDICINE | Facility: CLINIC | Age: 33
End: 2023-08-22
Payer: COMMERCIAL

## 2023-08-22 ENCOUNTER — MYC MEDICAL ADVICE (OUTPATIENT)
Dept: INTERNAL MEDICINE | Facility: CLINIC | Age: 33
End: 2023-08-22

## 2023-08-22 VITALS
BODY MASS INDEX: 27.84 KG/M2 | DIASTOLIC BLOOD PRESSURE: 70 MMHG | WEIGHT: 177.4 LBS | TEMPERATURE: 98.1 F | RESPIRATION RATE: 24 BRPM | SYSTOLIC BLOOD PRESSURE: 100 MMHG | HEART RATE: 85 BPM | OXYGEN SATURATION: 100 % | HEIGHT: 67 IN

## 2023-08-22 DIAGNOSIS — Z11.4 SCREENING FOR HIV (HUMAN IMMUNODEFICIENCY VIRUS): ICD-10-CM

## 2023-08-22 DIAGNOSIS — D50.0 IRON DEFICIENCY ANEMIA DUE TO CHRONIC BLOOD LOSS: ICD-10-CM

## 2023-08-22 DIAGNOSIS — A04.8 H. PYLORI INFECTION: ICD-10-CM

## 2023-08-22 DIAGNOSIS — Z11.59 NEED FOR HEPATITIS C SCREENING TEST: ICD-10-CM

## 2023-08-22 DIAGNOSIS — K64.8 INTERNAL HEMORRHOID, BLEEDING: ICD-10-CM

## 2023-08-22 DIAGNOSIS — Z00.00 ROUTINE GENERAL MEDICAL EXAMINATION AT A HEALTH CARE FACILITY: Primary | ICD-10-CM

## 2023-08-22 LAB
BASOPHILS # BLD AUTO: 0.1 10E3/UL (ref 0–0.2)
BASOPHILS NFR BLD AUTO: 1 %
EOSINOPHIL # BLD AUTO: 0.1 10E3/UL (ref 0–0.7)
EOSINOPHIL NFR BLD AUTO: 2 %
ERYTHROCYTE [DISTWIDTH] IN BLOOD BY AUTOMATED COUNT: 26.8 % (ref 10–15)
HCT VFR BLD AUTO: 30 % (ref 40–53)
HCV AB SERPL QL IA: NONREACTIVE
HGB BLD-MCNC: 8 G/DL (ref 13.3–17.7)
HIV 1+2 AB+HIV1 P24 AG SERPL QL IA: NONREACTIVE
IMM GRANULOCYTES # BLD: 0 10E3/UL
IMM GRANULOCYTES NFR BLD: 0 %
LYMPHOCYTES # BLD AUTO: 1.2 10E3/UL (ref 0.8–5.3)
LYMPHOCYTES NFR BLD AUTO: 24 %
MCH RBC QN AUTO: 17.7 PG (ref 26.5–33)
MCHC RBC AUTO-ENTMCNC: 26.7 G/DL (ref 31.5–36.5)
MCV RBC AUTO: 66 FL (ref 78–100)
MONOCYTES # BLD AUTO: 0.4 10E3/UL (ref 0–1.3)
MONOCYTES NFR BLD AUTO: 8 %
NEUTROPHILS # BLD AUTO: 3.3 10E3/UL (ref 1.6–8.3)
NEUTROPHILS NFR BLD AUTO: 65 %
NRBC # BLD AUTO: 0 10E3/UL
NRBC BLD AUTO-RTO: 0 /100
PLATELET # BLD AUTO: 225 10E3/UL (ref 150–450)
RBC # BLD AUTO: 4.52 10E6/UL (ref 4.4–5.9)
WBC # BLD AUTO: 5 10E3/UL (ref 4–11)

## 2023-08-22 PROCEDURE — 99395 PREV VISIT EST AGE 18-39: CPT | Performed by: INTERNAL MEDICINE

## 2023-08-22 PROCEDURE — 36415 COLL VENOUS BLD VENIPUNCTURE: CPT | Performed by: INTERNAL MEDICINE

## 2023-08-22 PROCEDURE — 86803 HEPATITIS C AB TEST: CPT | Performed by: INTERNAL MEDICINE

## 2023-08-22 PROCEDURE — 87389 HIV-1 AG W/HIV-1&-2 AB AG IA: CPT | Performed by: INTERNAL MEDICINE

## 2023-08-22 PROCEDURE — 99214 OFFICE O/P EST MOD 30 MIN: CPT | Mod: 25 | Performed by: INTERNAL MEDICINE

## 2023-08-22 PROCEDURE — 85025 COMPLETE CBC W/AUTO DIFF WBC: CPT | Performed by: INTERNAL MEDICINE

## 2023-08-22 RX ORDER — CLARITHROMYCIN 500 MG
500 TABLET ORAL 2 TIMES DAILY
Qty: 28 TABLET | Refills: 0 | Status: SHIPPED | OUTPATIENT
Start: 2023-08-22 | End: 2023-09-05

## 2023-08-22 RX ORDER — FERROUS SULFATE 325(65) MG
325 TABLET ORAL
Qty: 90 TABLET | Refills: 3 | Status: SHIPPED | OUTPATIENT
Start: 2023-08-22

## 2023-08-22 RX ORDER — PANTOPRAZOLE SODIUM 40 MG/1
TABLET, DELAYED RELEASE ORAL
Qty: 118 TABLET | Refills: 0 | Status: SHIPPED | OUTPATIENT
Start: 2023-08-22 | End: 2023-08-30

## 2023-08-22 RX ORDER — AMOXICILLIN 500 MG/1
1000 CAPSULE ORAL 2 TIMES DAILY
Qty: 56 CAPSULE | Refills: 0 | Status: SHIPPED | OUTPATIENT
Start: 2023-08-22 | End: 2023-09-05

## 2023-08-22 ASSESSMENT — PATIENT HEALTH QUESTIONNAIRE - PHQ9
SUM OF ALL RESPONSES TO PHQ QUESTIONS 1-9: 12
SUM OF ALL RESPONSES TO PHQ QUESTIONS 1-9: 12
10. IF YOU CHECKED OFF ANY PROBLEMS, HOW DIFFICULT HAVE THESE PROBLEMS MADE IT FOR YOU TO DO YOUR WORK, TAKE CARE OF THINGS AT HOME, OR GET ALONG WITH OTHER PEOPLE: NOT DIFFICULT AT ALL

## 2023-08-22 ASSESSMENT — ENCOUNTER SYMPTOMS
FEVER: 0
MYALGIAS: 1
SORE THROAT: 1
HEADACHES: 1
ARTHRALGIAS: 0
ABDOMINAL PAIN: 0
WEAKNESS: 0
DIZZINESS: 0
CONSTIPATION: 0
HEMATURIA: 0
DIARRHEA: 0
PARESTHESIAS: 0
PALPITATIONS: 0
DYSURIA: 0
EYE PAIN: 0
SHORTNESS OF BREATH: 0
HEMATOCHEZIA: 0
COUGH: 1
HEARTBURN: 0
JOINT SWELLING: 0
NAUSEA: 0
NERVOUS/ANXIOUS: 1
FREQUENCY: 1
CHILLS: 0

## 2023-08-22 NOTE — PROGRESS NOTES
SUBJECTIVE:   CC: George is an 32 year old who presents for preventative health visit.       8/22/2023    10:04 AM   Additional Questions   Roomed by CHULA Bello   Accompanied by dandre     Healthy Habits:     Getting at least 3 servings of Calcium per day:  NO    Bi-annual eye exam:  NO    Dental care twice a year:  Yes    Sleep apnea or symptoms of sleep apnea:  None    Diet:  Regular (no restrictions)    Frequency of exercise:  None    Taking medications regularly:  Yes    Medication side effects:  None    Additional concerns today:  Yes    Today's PHQ-9 Score:       8/22/2023     9:58 AM   PHQ-9 SCORE   PHQ-9 Total Score MyChart 12 (Moderate depression)   PHQ-9 Total Score 12       ED/UC Followup:    Facility:  [facility]   Date of visit: 8/16/2023- 8/16/2023  Reason for visit: anemia   Current Status: n/a     Patient initially presented to [facility] 8/16/23 for L arm pain, sob, LENNON and chest pain. He was sent to ED. Found to have a Hgb of 4.9 with ferritin 1, iron 13, Tsat 3%, TIBC 452. EGD: Normal esophagus, small hiatal hernia, gastric erosion with no bleeding and no stigmata of recent bleeding.  Colonoscopy that day showed diverticulosis, internal hemorrhoids, no active bleeding.  Mentions that hemorrhoids appear to be the cause of rectal bleeding.  He received 2 units of PRBCs and hemoglobin was up to 7.2 at discharge on 8/17.    Pathology from EGD noted that the gastric biopsy specimen was notable for moderate to severe chronic gastritis due to H. pylori infection.  They are doing additional stains to rule out an associated monoclonal population of the lymphocytes.    Today he tells me he first noticed hemorrhoid 8 years ago. Has had intermittent bleeding since then but it was picking up in the last few years. Definitely more bleeding in last 2-3 years, especially with spicy foods or drinking alcohol. Then in the last 1-2 months was bleeding daily. Did improve slightly but then got sob and LENNON so went to [facility].     He  reports that he did not have any issues with constipation, more frequent looser stools that would then irritate his hemorrhoid and lead to bleeding.  He has been using Metamucil since discharge.  He has various other questions.  Has headaches intermittently that resolved with napping.  He gets intermittent feeling of change in his vision like he is daydreaming, this is not very frequent and is not associated with actual blurred vision.    FMLA now until 9/13.     Have you ever done Advance Care Planning? (For example, a Health Directive, POLST, or a discussion with a medical provider or your loved ones about your wishes): Yes, patient states has an Advance Care Planning document and will bring a copy to the clinic.    Social History     Tobacco Use    Smoking status: Never    Smokeless tobacco: Never   Substance Use Topics    Alcohol use: Yes     Alcohol/week: 1.0 - 2.0 standard drink of alcohol           8/22/2023     9:56 AM   Alcohol Use   Prescreen: >3 drinks/day or >7 drinks/week? No       Last PSA: No results found for: PSA    Reviewed orders with patient. Reviewed health maintenance and updated orders accordingly - Yes  Lab work is in process  Labs reviewed in EPIC    Reviewed and updated as needed this visit by clinical staff   Tobacco  Allergies  Meds  Problems  Med Hx  Surg Hx  Fam Hx          Reviewed and updated as needed this visit by Provider   Tobacco  Allergies  Meds  Problems  Med Hx  Surg Hx  Fam Hx         History reviewed. No pertinent past medical history.   Past Surgical History:   Procedure Laterality Date    COLONOSCOPY  08/17/2023    COLONOSCOPY N/A 8/17/2023    Procedure: COLONOSCOPY;  Surgeon: Boyd Davis MD;  Location: [facility]    EGD  08/17/2023    ESOPHAGOSCOPY, GASTROSCOPY, DUODENOSCOPY (EGD), COMBINED N/A 8/17/2023    Procedure: ESOPHAGOGASTRODUODENOSCOPY;  Surgeon: Boyd Davis MD;  Location: [facility]       Review of Systems  "  Constitutional:  Negative for chills and fever.   HENT:  Positive for sore throat. Negative for congestion, ear pain and hearing loss.    Eyes:  Positive for visual disturbance. Negative for pain.   Respiratory:  Positive for cough. Negative for shortness of breath.    Cardiovascular:  Negative for chest pain, palpitations and peripheral edema.   Gastrointestinal:  Negative for abdominal pain, constipation, diarrhea, heartburn, hematochezia and nausea.   Genitourinary:  Positive for frequency. Negative for dysuria, genital sores, hematuria and urgency.   Musculoskeletal:  Positive for myalgias. Negative for arthralgias and joint swelling.   Skin:  Negative for rash.   Neurological:  Positive for headaches. Negative for dizziness, weakness and paresthesias.   Psychiatric/Behavioral:  Negative for mood changes. The patient is nervous/anxious.        OBJECTIVE:   /70 (BP Location: Right arm, Patient Position: Sitting, Cuff Size: Adult Regular)   Pulse 85   Temp 98.1  F (36.7  C) (Oral)   Resp 24   Ht 1.689 m (5' 6.5\")   Wt 80.5 kg (177 lb 6.4 oz)   SpO2 100%   BMI 28.20 kg/m      Physical Exam  GENERAL: healthy, alert and no distress  EYES: Eyes grossly normal to inspection, PERRL and conjunctivae and sclerae normal  HENT: nose and mouth without ulcers or lesions  RESP: lungs clear to auscultation - no rales, rhonchi or wheezes  CV: regular rate and rhythm, normal S1 S2, no S3 or S4, no murmur, click or rub, no peripheral edema and peripheral pulses strong  ABDOMEN: soft, nontender, no hepatosplenomegaly, no masses and bowel sounds normal  MS: no gross musculoskeletal defects noted, no edema  SKIN: no suspicious lesions or rashes  NEURO: Normal strength and tone, mentation intact and speech normal  PSYCH: mentation appears normal, affect normal/bright    Diagnostic Test Results:  Labs reviewed in Epic  Results for orders placed or performed in visit on 08/22/23 (from the past 24 hour(s))   CBC with " platelets and differential    Narrative    The following orders were created for panel order CBC with platelets and differential.  Procedure                               Abnormality         Status                     ---------                               -----------         ------                     CBC with platelets and d...[[id number]]                      In process                   Please view results for these tests on the individual orders.       ASSESSMENT/PLAN:     Problem List Items Addressed This Visit          Digestive    Internal hemorrhoid, bleeding     Patient presented to the emergency room on 8/16 from walk-in care with an array of symptoms (dyspnea on exertion, migraines, lightheadedness, chest pain) found to have significant, severe anemia with a hemoglobin of 4.9.  He subsequently underwent EGD and colonoscopy that were overall fairly benign aside from chronic gastritis and internal hemorrhoids.  It was felt that his internal hemorrhoids were likely the source of bleeding.  He does tell me today that this has been a problem for him for the last 8 years.  Since discharge she has not had any rectal bleeding.  He did receive 2 units of pRBCs during admission with Hgb up to 7.2 on 8/17.   - Repeat CBC today and again in 1 month   - Continue iron supplement, recommended ferrous sulfate 325 mg daily and not his OTC supplement that does not have as more iron in it  - Continue vitamin C with iron for better absorption  - Continue metamucil, if he notices any recurrent rectal bleeding will refer to [facility] for treatment of hemorrhoids  - Follow up in 3 months         Relevant Medications    pantoprazole (PROTONIX) 40 MG EC tablet    ferrous sulfate (FEROSUL) 325 (65 Fe) MG tablet    Other Relevant Orders    CBC with platelets and differential       Hematologic    Iron deficiency anemia due to chronic blood loss     As per hemorrhoidal bleeding.         Relevant Medications    ferrous sulfate (FEROSUL)  "325 (65 Fe) MG tablet       Infectious/Inflammatory    H. 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  -We will start triple therapy with clarithromycin, amoxicillin and pantoprazole twice daily for 14 days         Relevant Medications    clarithromycin (BIAXIN) 500 MG tablet    amoxicillin (AMOXIL) 500 MG capsule    Other Relevant Orders    CBC with platelets and differential       Other    Routine general medical examination at a health care facility - Primary     We discussed healthy lifestyle, nutrition, cardiovascular risk reduction, self care, safety, sunscreen, and timing of cancer screening.  Health maintenance screening and immunizations reviewed with the patient.    - Repeat labs today   - Will get next COVID booster in the fall   - HIV and HCV screening today             Other Visit Diagnoses       Screening for HIV (human immunodeficiency virus)        Relevant Orders    HIV Antigen Antibody Combo    Need for hepatitis C screening test        Relevant Orders    Hepatitis C Screen Reflex to HCV RNA Quant and Genotype            Patient has been advised of split billing requirements and indicates understanding: Yes    Depression Screening Follow Up        8/22/2023     9:58 AM   PHQ   PHQ-9 Total Score 12   Q9: Thoughts of better off dead/self-harm past 2 weeks Not at all     Follow Up Actions Taken  Crisis resource information provided in After Visit Summary     COUNSELING:   Reviewed preventive health counseling, as reflected in patient instructions  Special attention given to:        Regular exercise       Healthy diet/nutrition    BMI:   Estimated body mass index is 28.2 kg/m  as calculated from the following:    Height as of this encounter: 1.689 m (5' 6.5\").    Weight as of this encounter: 80.5 kg (177 lb 6.4 oz).   Weight management plan: Discussed healthy diet and exercise guidelines    He reports that he has never smoked. He has never " used smokeless tobacco.          Lena Jones MD  [facility]    Answers submitted by the patient for this visit:  Patient Health Questionnaire (Submitted on 8/22/2023)  If you checked off any problems, how difficult have these problems made it for you to do your work, take care of things at home, or get along with other people?: Not difficult at all  PHQ9 TOTAL SCORE: 12

## 2023-08-22 NOTE — ASSESSMENT & PLAN NOTE
We discussed healthy lifestyle, nutrition, cardiovascular risk reduction, self care, safety, sunscreen, and timing of cancer screening.  Health maintenance screening and immunizations reviewed with the patient.    - Repeat labs today   - Will get next COVID booster in the fall   - HIV and HCV screening today

## 2023-08-22 NOTE — ASSESSMENT & PLAN NOTE
Patient presented to the emergency room on 8/16 from walk-in care with an array of symptoms (dyspnea on exertion, migraines, lightheadedness, chest pain) found to have significant, severe anemia with a hemoglobin of 4.9.  He subsequently underwent EGD and colonoscopy that were overall fairly benign aside from chronic gastritis and internal hemorrhoids.  It was felt that his internal hemorrhoids were likely the source of bleeding.  He does tell me today that this has been a problem for him for the last 8 years.  Since discharge she has not had any rectal bleeding.  He did receive 2 units of pRBCs during admission with Hgb up to 7.2 on 8/17.   - Repeat CBC today and again in 1 month   - Continue iron supplement, recommended ferrous sulfate 325 mg daily and not his OTC supplement that does not have as more iron in it  - Continue vitamin C with iron for better absorption  - Continue metamucil, if he notices any recurrent rectal bleeding will refer to [facility] for treatment of hemorrhoids  - Follow up in 3 months

## 2023-08-22 NOTE — ASSESSMENT & PLAN NOTE
EGD 8/17 biopsy noted moderate to severe chronic gastric neuritis due to H. pylori.  -We will start triple therapy with clarithromycin, amoxicillin and pantoprazole twice daily for 14 days

## 2023-08-22 NOTE — PATIENT INSTRUCTIONS
For H pylori treatment - next 14 days:   - Pantoprazole 40 mg twice daily   - Clarithromycin 500 mg twice daily   - Amoxicillin 1g (two 500 mg tablets) twice daily       For iron - look for ferrous sulfate 325 mg (65 mg of iron) daily.     Continue 1000 mg daily of vitamin C with iron supplement.     Preventive Health Recommendations  Male Ages 26 - 39    Yearly exam:             See your health care provider every year in order to  o   Review health changes.   o   Discuss preventive care.    o   Review your medicines if your doctor has prescribed any.  You should be tested each year for STDs (sexually transmitted diseases), if you re at risk.   After age 35, talk to your provider about cholesterol testing. If you are at risk for heart disease, have your cholesterol tested at least every 5 years.   If you are at risk for diabetes, you should have a diabetes test (fasting glucose).  Shots: Get a flu shot each year. Get a tetanus shot every 10 years.     Nutrition:  Eat at least 5 servings of fruits and vegetables daily.   Eat whole-grain bread, whole-wheat pasta and brown rice instead of white grains and rice.   Get adequate Calcium and Vitamin D.     Lifestyle  Exercise for at least 150 minutes a week (30 minutes a day, 5 days a week). This will help you control your weight and prevent disease.   Limit alcohol to one drink per day.   No smoking.   Wear sunscreen to prevent skin cancer.   See your dentist every six months for an exam and cleaning.

## 2023-08-23 LAB
PATH REPORT.ADDENDUM SPEC: NORMAL
PATH REPORT.COMMENTS IMP SPEC: NORMAL
PATH REPORT.FINAL DX SPEC: NORMAL
PATH REPORT.GROSS SPEC: NORMAL
PATH REPORT.MICROSCOPIC SPEC OTHER STN: NORMAL
PATH REPORT.RELEVANT HX SPEC: NORMAL
PHOTO IMAGE: NORMAL

## 2023-08-24 ENCOUNTER — MYC MEDICAL ADVICE (OUTPATIENT)
Dept: INTERNAL MEDICINE | Facility: CLINIC | Age: 33
End: 2023-08-24
Payer: COMMERCIAL

## 2023-08-24 DIAGNOSIS — A04.8 H. PYLORI INFECTION: Primary | ICD-10-CM

## 2023-08-24 NOTE — TELEPHONE ENCOUNTER
Faxed forms to number provided by pt in [employer] msg. Copies made and placed in hold bin and scanning.       Yaron Gonzalez Jr., CMA on 8/24/2023 at 2:32 PM

## 2023-08-25 RX ORDER — CLARITHROMYCIN 500 MG
500 TABLET ORAL 2 TIMES DAILY
Qty: 5 TABLET | Refills: 0 | Status: SHIPPED | OUTPATIENT
Start: 2023-08-25 | End: 2023-08-30

## 2023-08-30 ENCOUNTER — MYC MEDICAL ADVICE (OUTPATIENT)
Dept: INTERNAL MEDICINE | Facility: CLINIC | Age: 33
End: 2023-08-30
Payer: COMMERCIAL

## 2023-08-30 DIAGNOSIS — K64.8 INTERNAL HEMORRHOID, BLEEDING: ICD-10-CM

## 2023-08-31 RX ORDER — PANTOPRAZOLE SODIUM 40 MG/1
40 TABLET, DELAYED RELEASE ORAL DAILY
Qty: 90 TABLET | Refills: 1 | Status: SHIPPED | OUTPATIENT
Start: 2023-08-31 | End: 2023-12-05

## 2023-09-25 ENCOUNTER — TRANSFERRED RECORDS (OUTPATIENT)
Dept: HEALTH INFORMATION MANAGEMENT | Facility: CLINIC | Age: 33
End: 2023-09-25
Payer: COMMERCIAL

## 2023-09-26 ENCOUNTER — LAB (OUTPATIENT)
Dept: LAB | Facility: CLINIC | Age: 33
End: 2023-09-26
Payer: COMMERCIAL

## 2023-09-26 ENCOUNTER — MYC MEDICAL ADVICE (OUTPATIENT)
Dept: INTERNAL MEDICINE | Facility: CLINIC | Age: 33
End: 2023-09-26

## 2023-09-26 DIAGNOSIS — K64.8 INTERNAL HEMORRHOID, BLEEDING: ICD-10-CM

## 2023-09-26 LAB
BASOPHILS # BLD AUTO: 0 10E3/UL (ref 0–0.2)
BASOPHILS NFR BLD AUTO: 1 %
EOSINOPHIL # BLD AUTO: 0.1 10E3/UL (ref 0–0.7)
EOSINOPHIL NFR BLD AUTO: 2 %
ERYTHROCYTE [DISTWIDTH] IN BLOOD BY AUTOMATED COUNT: 25.7 % (ref 10–15)
HCT VFR BLD AUTO: 47.3 % (ref 40–53)
HGB BLD-MCNC: 13.3 G/DL (ref 13.3–17.7)
IMM GRANULOCYTES # BLD: 0.1 10E3/UL
IMM GRANULOCYTES NFR BLD: 1 %
LYMPHOCYTES # BLD AUTO: 1.6 10E3/UL (ref 0.8–5.3)
LYMPHOCYTES NFR BLD AUTO: 32 %
MCH RBC QN AUTO: 21.6 PG (ref 26.5–33)
MCHC RBC AUTO-ENTMCNC: 28.1 G/DL (ref 31.5–36.5)
MCV RBC AUTO: 77 FL (ref 78–100)
MONOCYTES # BLD AUTO: 0.4 10E3/UL (ref 0–1.3)
MONOCYTES NFR BLD AUTO: 8 %
NEUTROPHILS # BLD AUTO: 2.8 10E3/UL (ref 1.6–8.3)
NEUTROPHILS NFR BLD AUTO: 56 %
NRBC # BLD AUTO: 0 10E3/UL
NRBC BLD AUTO-RTO: 0 /100
PLATELET # BLD AUTO: 262 10E3/UL (ref 150–450)
RBC # BLD AUTO: 6.17 10E6/UL (ref 4.4–5.9)
WBC # BLD AUTO: 5.1 10E3/UL (ref 4–11)

## 2023-09-26 PROCEDURE — 36415 COLL VENOUS BLD VENIPUNCTURE: CPT

## 2023-09-26 PROCEDURE — 85025 COMPLETE CBC W/AUTO DIFF WBC: CPT

## 2023-10-03 ENCOUNTER — MYC MEDICAL ADVICE (OUTPATIENT)
Dept: INTERNAL MEDICINE | Facility: CLINIC | Age: 33
End: 2023-10-03
Payer: COMMERCIAL

## 2023-10-03 DIAGNOSIS — K64.8 INTERNAL HEMORRHOID, BLEEDING: Primary | ICD-10-CM

## 2023-10-06 ENCOUNTER — TRANSFERRED RECORDS (OUTPATIENT)
Dept: HEALTH INFORMATION MANAGEMENT | Facility: CLINIC | Age: 33
End: 2023-10-06
Payer: COMMERCIAL

## 2023-10-10 ENCOUNTER — LAB (OUTPATIENT)
Dept: LAB | Facility: CLINIC | Age: 33
End: 2023-10-10
Payer: COMMERCIAL

## 2023-10-10 DIAGNOSIS — K64.8 INTERNAL HEMORRHOID, BLEEDING: ICD-10-CM

## 2023-10-10 LAB
ERYTHROCYTE [DISTWIDTH] IN BLOOD BY AUTOMATED COUNT: 21.2 % (ref 10–15)
HCT VFR BLD AUTO: 49.1 % (ref 40–53)
HGB BLD-MCNC: 15 G/DL (ref 13.3–17.7)
MCH RBC QN AUTO: 23.8 PG (ref 26.5–33)
MCHC RBC AUTO-ENTMCNC: 30.5 G/DL (ref 31.5–36.5)
MCV RBC AUTO: 78 FL (ref 78–100)
PLATELET # BLD AUTO: 233 10E3/UL (ref 150–450)
RBC # BLD AUTO: 6.31 10E6/UL (ref 4.4–5.9)
WBC # BLD AUTO: 4.8 10E3/UL (ref 4–11)

## 2023-10-10 PROCEDURE — 85027 COMPLETE CBC AUTOMATED: CPT

## 2023-10-10 PROCEDURE — 36415 COLL VENOUS BLD VENIPUNCTURE: CPT

## 2023-10-26 ENCOUNTER — TRANSFERRED RECORDS (OUTPATIENT)
Dept: HEALTH INFORMATION MANAGEMENT | Facility: CLINIC | Age: 33
End: 2023-10-26
Payer: COMMERCIAL

## 2023-11-30 ENCOUNTER — TRANSFERRED RECORDS (OUTPATIENT)
Dept: HEALTH INFORMATION MANAGEMENT | Facility: CLINIC | Age: 33
End: 2023-11-30
Payer: COMMERCIAL

## 2023-12-05 ENCOUNTER — OFFICE VISIT (OUTPATIENT)
Dept: INTERNAL MEDICINE | Facility: CLINIC | Age: 33
End: 2023-12-05
Payer: COMMERCIAL

## 2023-12-05 VITALS
OXYGEN SATURATION: 97 % | SYSTOLIC BLOOD PRESSURE: 104 MMHG | DIASTOLIC BLOOD PRESSURE: 72 MMHG | HEART RATE: 70 BPM | RESPIRATION RATE: 18 BRPM

## 2023-12-05 DIAGNOSIS — D50.0 IRON DEFICIENCY ANEMIA DUE TO CHRONIC BLOOD LOSS: Primary | ICD-10-CM

## 2023-12-05 DIAGNOSIS — Z13.220 SCREENING FOR HYPERLIPIDEMIA: ICD-10-CM

## 2023-12-05 DIAGNOSIS — Z23 ENCOUNTER FOR VACCINATION: ICD-10-CM

## 2023-12-05 LAB
CHOLEST SERPL-MCNC: 205 MG/DL
ERYTHROCYTE [DISTWIDTH] IN BLOOD BY AUTOMATED COUNT: 13.7 % (ref 10–15)
FASTING STATUS PATIENT QL REPORTED: ABNORMAL
FERRITIN SERPL-MCNC: 22 NG/ML (ref 31–409)
HCT VFR BLD AUTO: 48.4 % (ref 40–53)
HDLC SERPL-MCNC: 40 MG/DL
HGB BLD-MCNC: 16 G/DL (ref 13.3–17.7)
IRON BINDING CAPACITY (ROCHE): 430 UG/DL (ref 240–430)
IRON SATN MFR SERPL: 14 % (ref 15–46)
IRON SERPL-MCNC: 62 UG/DL (ref 61–157)
LDLC SERPL CALC-MCNC: 136 MG/DL
MCH RBC QN AUTO: 26.1 PG (ref 26.5–33)
MCHC RBC AUTO-ENTMCNC: 33.1 G/DL (ref 31.5–36.5)
MCV RBC AUTO: 79 FL (ref 78–100)
NONHDLC SERPL-MCNC: 165 MG/DL
PLATELET # BLD AUTO: 206 10E3/UL (ref 150–450)
RBC # BLD AUTO: 6.13 10E6/UL (ref 4.4–5.9)
TRIGL SERPL-MCNC: 143 MG/DL
WBC # BLD AUTO: 5.8 10E3/UL (ref 4–11)

## 2023-12-05 PROCEDURE — 83540 ASSAY OF IRON: CPT | Performed by: INTERNAL MEDICINE

## 2023-12-05 PROCEDURE — 99213 OFFICE O/P EST LOW 20 MIN: CPT | Mod: 25 | Performed by: INTERNAL MEDICINE

## 2023-12-05 PROCEDURE — 36415 COLL VENOUS BLD VENIPUNCTURE: CPT | Performed by: INTERNAL MEDICINE

## 2023-12-05 PROCEDURE — 90471 IMMUNIZATION ADMIN: CPT | Performed by: INTERNAL MEDICINE

## 2023-12-05 PROCEDURE — 83550 IRON BINDING TEST: CPT | Performed by: INTERNAL MEDICINE

## 2023-12-05 PROCEDURE — 91320 SARSCV2 VAC 30MCG TRS-SUC IM: CPT | Performed by: INTERNAL MEDICINE

## 2023-12-05 PROCEDURE — 90480 ADMN SARSCOV2 VAC 1/ONLY CMP: CPT | Performed by: INTERNAL MEDICINE

## 2023-12-05 PROCEDURE — 80061 LIPID PANEL: CPT | Performed by: INTERNAL MEDICINE

## 2023-12-05 PROCEDURE — 85027 COMPLETE CBC AUTOMATED: CPT | Performed by: INTERNAL MEDICINE

## 2023-12-05 PROCEDURE — 82728 ASSAY OF FERRITIN: CPT | Performed by: INTERNAL MEDICINE

## 2023-12-05 PROCEDURE — 90686 IIV4 VACC NO PRSV 0.5 ML IM: CPT | Performed by: INTERNAL MEDICINE

## 2023-12-05 NOTE — ASSESSMENT & PLAN NOTE
Patient had significant iron deficiency anemia due to hemorrhoidal bleeding earlier this year.  Hemoglobin was down to 4.9 when he presented to ED 8/2023. He subsequently underwent EGD and colonoscopy that were overall fairly benign aside from chronic gastritis and internal hemorrhoids. H. Pylori was positive on EGD biopsies. Since then, he has done very well.  He completed H. pylori treatment.  He has been taking iron supplement and on labs in October hemoglobin had come back up to normal.  - Okay to stop protonix  - Repeat CBC, iron panel, ferritin today  - If these levels back to normal, likely okay to stop iron supplement as well

## 2023-12-05 NOTE — PROGRESS NOTES
Assessment & Plan   Problem List Items Addressed This Visit          Hematologic    Iron deficiency anemia due to chronic blood loss - Primary     Patient had significant iron deficiency anemia due to hemorrhoidal bleeding earlier this year.  Hemoglobin was down to 4.9 when he presented to ED 8/2023. He subsequently underwent EGD and colonoscopy that were overall fairly benign aside from chronic gastritis and internal hemorrhoids. H. Pylori was positive on EGD biopsies. Since then, he has done very well.  He completed H. pylori treatment.  He has been taking iron supplement and on labs in October hemoglobin had come back up to normal.  - Okay to stop protonix  - Repeat CBC, iron panel, ferritin today  - If these levels back to normal, likely okay to stop iron supplement as well         Relevant Orders    CBC with platelets    Iron and iron binding capacity    Ferritin     Other Visit Diagnoses       Encounter for vaccination        Relevant Orders    INFLUENZA VACCINE >6 MONTHS (AFLURIA/FLUZONE) (Completed)    COVID-19 12+ (2023-24) (PFIZER) (Completed)    Screening for hyperlipidemia        Relevant Orders    Lipid panel reflex to direct LDL Non-fasting             Ordering of each unique test  Prescription drug management      FUTURE APPOINTMENTS:       - Follow-up for annual visit or as needed    Lena Jones MD  [facility]    Judy Vazquez is a 33 year old, presenting for the following health issues:  Follow Up        12/5/2023    11:16 AM   Additional Questions   Roomed by Lamonte Bernstein       History of Present Illness       Reason for visit:  Follow up    He eats 2-3 servings of fruits and vegetables daily.He consumes 1 sweetened beverage(s) daily.He exercises with enough effort to increase his heart rate 30 to 60 minutes per day.  He exercises with enough effort to increase his heart rate 5 days per week.   He is taking medications regularly.     George is coming in for  follow-up today.  Since our last visit, he has had hemorrhoid banding x 3.  This has gone well and he is no longer having any rectal bleeding.  Having normal bowel movements.  He has stopped his pantoprazole.  Denies any reflux symptoms today.  He is taking his iron supplement about once a week.  Last labs in October showed hemoglobin had normalized.    Review of Systems   Constitutional, HEENT, cardiovascular, pulmonary, gi and gu systems are negative, except as otherwise noted.      Objective    /72 (BP Location: Right arm, Patient Position: Sitting, Cuff Size: Adult Regular)   Pulse 70   Resp 18   SpO2 97%   There is no height or weight on file to calculate BMI.  Physical Exam   GENERAL: healthy, alert and no distress  EYES: Eyes grossly normal to inspection, PERRL and conjunctivae and sclerae normal  HENT: ear canals and TM's normal, nose and mouth without ulcers or lesions  NECK: no adenopathy, no asymmetry, masses, or scars and thyroid normal to palpation  RESP: lungs clear to auscultation - no rales, rhonchi or wheezes  CV: regular rate and rhythm, normal S1 S2, no S3 or S4, no murmur, click or rub, no peripheral edema and peripheral pulses strong  ABDOMEN: soft, nontender, no hepatosplenomegaly, no masses and bowel sounds normal  MS: no gross musculoskeletal defects noted, no edema  SKIN: no suspicious lesions or rashes  NEURO: Normal strength and tone, mentation intact and speech normal  PSYCH: mentation appears normal, affect normal/bright    No results found for this or any previous visit (from the past 24 hour(s)).

## 2023-12-05 NOTE — PATIENT INSTRUCTIONS
Next physical will be due end of August 2024.     Okay to be done with pantoprazole.     I will send message about need for continued iron supplement or not.

## 2024-02-13 ENCOUNTER — TRANSFERRED RECORDS (OUTPATIENT)
Dept: HEALTH INFORMATION MANAGEMENT | Facility: CLINIC | Age: 34
End: 2024-02-13
Payer: COMMERCIAL

## 2024-10-05 ENCOUNTER — HEALTH MAINTENANCE LETTER (OUTPATIENT)
Age: 34
End: 2024-10-05

## (undated) DEVICE — TUBING SUCTION MEDI-VAC 1/4"X20' N620A - HE

## (undated) DEVICE — FORCEP BIOPSY 2.3MM DISP COATED 000388

## (undated) DEVICE — SOL WATER IRRIG 1000ML BOTTLE 2F7114

## (undated) DEVICE — SUCTION MANIFOLD NEPTUNE 2 SYS 1 PORT 702-025-000

## (undated) RX ORDER — LIDOCAINE HYDROCHLORIDE 10 MG/ML
INJECTION, SOLUTION EPIDURAL; INFILTRATION; INTRACAUDAL; PERINEURAL
Status: DISPENSED
Start: 2023-08-17

## (undated) RX ORDER — ONDANSETRON 2 MG/ML
INJECTION INTRAMUSCULAR; INTRAVENOUS
Status: DISPENSED
Start: 2023-08-17

## (undated) RX ORDER — PROPOFOL 10 MG/ML
INJECTION, EMULSION INTRAVENOUS
Status: DISPENSED
Start: 2023-08-17

## (undated) RX ORDER — FENTANYL CITRATE 50 UG/ML
INJECTION, SOLUTION INTRAMUSCULAR; INTRAVENOUS
Status: DISPENSED
Start: 2023-08-17

## (undated) RX ORDER — FENTANYL CITRATE-0.9 % NACL/PF 10 MCG/ML
PLASTIC BAG, INJECTION (ML) INTRAVENOUS
Status: DISPENSED
Start: 2023-08-17

## (undated) RX ORDER — DEXAMETHASONE SODIUM PHOSPHATE 10 MG/ML
INJECTION, SOLUTION INTRAMUSCULAR; INTRAVENOUS
Status: DISPENSED
Start: 2023-08-17